# Patient Record
Sex: FEMALE | NOT HISPANIC OR LATINO | Employment: OTHER | URBAN - METROPOLITAN AREA
[De-identification: names, ages, dates, MRNs, and addresses within clinical notes are randomized per-mention and may not be internally consistent; named-entity substitution may affect disease eponyms.]

---

## 2021-03-11 PROCEDURE — U0004 COV-19 TEST NON-CDC HGH THRU: HCPCS | Performed by: FAMILY MEDICINE

## 2021-03-11 PROCEDURE — U0005 INFEC AGEN DETEC AMPLI PROBE: HCPCS | Performed by: FAMILY MEDICINE

## 2021-03-12 ENCOUNTER — TELEPHONE (OUTPATIENT)
Dept: URGENT CARE | Facility: CLINIC | Age: 86
End: 2021-03-12

## 2021-04-09 ENCOUNTER — APPOINTMENT (OUTPATIENT)
Dept: GENERAL RADIOLOGY | Facility: HOSPITAL | Age: 86
End: 2021-04-09

## 2021-04-09 ENCOUNTER — HOSPITAL ENCOUNTER (INPATIENT)
Facility: HOSPITAL | Age: 86
LOS: 4 days | Discharge: NURSING, REHAB OR LONG-TERM CARE | End: 2021-04-13
Attending: EMERGENCY MEDICINE | Admitting: FAMILY MEDICINE

## 2021-04-09 ENCOUNTER — ANESTHESIA EVENT (OUTPATIENT)
Dept: PERIOP | Facility: HOSPITAL | Age: 86
End: 2021-04-09

## 2021-04-09 DIAGNOSIS — D72.829 LEUKOCYTOSIS, UNSPECIFIED TYPE: ICD-10-CM

## 2021-04-09 DIAGNOSIS — S42.212A CLOSED DISPLACED FRACTURE OF SURGICAL NECK OF LEFT HUMERUS, UNSPECIFIED FRACTURE MORPHOLOGY, INITIAL ENCOUNTER: Primary | ICD-10-CM

## 2021-04-09 PROBLEM — D64.9 ANEMIA: Status: ACTIVE | Noted: 2021-04-09

## 2021-04-09 PROBLEM — H10.32 ACUTE CONJUNCTIVITIS OF LEFT EYE: Status: ACTIVE | Noted: 2021-04-09

## 2021-04-09 LAB
ABO GROUP BLD: NORMAL
ABO GROUP BLD: NORMAL
ALBUMIN SERPL-MCNC: 3.4 G/DL (ref 3.5–5.2)
ALBUMIN/GLOB SERPL: 0.9 G/DL
ALP SERPL-CCNC: 135 U/L (ref 39–117)
ALT SERPL W P-5'-P-CCNC: 9 U/L (ref 1–33)
ANION GAP SERPL CALCULATED.3IONS-SCNC: 11 MMOL/L (ref 5–15)
ANTI-E: NORMAL
AST SERPL-CCNC: 17 U/L (ref 1–32)
BACTERIA UR QL AUTO: NORMAL /HPF
BASOPHILS # BLD AUTO: 0.07 10*3/MM3 (ref 0–0.2)
BASOPHILS NFR BLD AUTO: 0.3 % (ref 0–1.5)
BILIRUB SERPL-MCNC: 0.3 MG/DL (ref 0–1.2)
BILIRUB UR QL STRIP: NEGATIVE
BLD GP AB SCN SERPL QL: POSITIVE
BUN SERPL-MCNC: 16 MG/DL (ref 8–23)
BUN/CREAT SERPL: 16.3 (ref 7–25)
CALCIUM SPEC-SCNC: 9.3 MG/DL (ref 8.6–10.5)
CHLORIDE SERPL-SCNC: 101 MMOL/L (ref 98–107)
CLARITY UR: CLEAR
CO2 SERPL-SCNC: 27 MMOL/L (ref 22–29)
COLOR UR: YELLOW
CREAT SERPL-MCNC: 0.98 MG/DL (ref 0.57–1)
DEPRECATED RDW RBC AUTO: 43 FL (ref 37–54)
EOSINOPHIL # BLD AUTO: 0 10*3/MM3 (ref 0–0.4)
EOSINOPHIL NFR BLD AUTO: 0 % (ref 0.3–6.2)
ERYTHROCYTE [DISTWIDTH] IN BLOOD BY AUTOMATED COUNT: 12.9 % (ref 12.3–15.4)
FLUAV RNA RESP QL NAA+PROBE: NOT DETECTED
FLUBV RNA RESP QL NAA+PROBE: NOT DETECTED
GFR SERPL CREATININE-BSD FRML MDRD: 53 ML/MIN/1.73
GFR SERPL CREATININE-BSD FRML MDRD: 65 ML/MIN/1.73
GLOBULIN UR ELPH-MCNC: 3.6 GM/DL
GLUCOSE SERPL-MCNC: 141 MG/DL (ref 65–99)
GLUCOSE UR STRIP-MCNC: NEGATIVE MG/DL
HCT VFR BLD AUTO: 30.1 % (ref 34–46.6)
HGB BLD-MCNC: 9.5 G/DL (ref 12–15.9)
HGB UR QL STRIP.AUTO: NEGATIVE
HYALINE CASTS UR QL AUTO: NORMAL /LPF
IMM GRANULOCYTES # BLD AUTO: 0.22 10*3/MM3 (ref 0–0.05)
IMM GRANULOCYTES NFR BLD AUTO: 0.8 % (ref 0–0.5)
KETONES UR QL STRIP: NEGATIVE
LEUKOCYTE ESTERASE UR QL STRIP.AUTO: ABNORMAL
LYMPHOCYTES # BLD AUTO: 0.93 10*3/MM3 (ref 0.7–3.1)
LYMPHOCYTES NFR BLD AUTO: 3.5 % (ref 19.6–45.3)
MCH RBC QN AUTO: 28.9 PG (ref 26.6–33)
MCHC RBC AUTO-ENTMCNC: 31.6 G/DL (ref 31.5–35.7)
MCV RBC AUTO: 91.5 FL (ref 79–97)
MONOCYTES # BLD AUTO: 1.42 10*3/MM3 (ref 0.1–0.9)
MONOCYTES NFR BLD AUTO: 5.4 % (ref 5–12)
NEUTROPHILS NFR BLD AUTO: 23.86 10*3/MM3 (ref 1.7–7)
NEUTROPHILS NFR BLD AUTO: 90 % (ref 42.7–76)
NITRITE UR QL STRIP: NEGATIVE
NRBC BLD AUTO-RTO: 0 /100 WBC (ref 0–0.2)
PH UR STRIP.AUTO: 6 [PH] (ref 5–8)
PLATELET # BLD AUTO: 554 10*3/MM3 (ref 140–450)
PMV BLD AUTO: 9.6 FL (ref 6–12)
POTASSIUM SERPL-SCNC: 3.4 MMOL/L (ref 3.5–5.2)
PROT SERPL-MCNC: 7 G/DL (ref 6–8.5)
PROT UR QL STRIP: ABNORMAL
QT INTERVAL: 370 MS
QTC INTERVAL: 452 MS
RBC # BLD AUTO: 3.29 10*6/MM3 (ref 3.77–5.28)
RBC # UR: NORMAL /HPF
REF LAB TEST METHOD: NORMAL
RH BLD: POSITIVE
RH BLD: POSITIVE
SARS-COV-2 RNA RESP QL NAA+PROBE: NOT DETECTED
SODIUM SERPL-SCNC: 139 MMOL/L (ref 136–145)
SP GR UR STRIP: 1.02 (ref 1–1.03)
SQUAMOUS #/AREA URNS HPF: NORMAL /HPF
T&S EXPIRATION DATE: NORMAL
TROPONIN T SERPL-MCNC: <0.01 NG/ML (ref 0–0.03)
UROBILINOGEN UR QL STRIP: ABNORMAL
WBC # BLD AUTO: 26.5 10*3/MM3 (ref 3.4–10.8)
WBC UR QL AUTO: NORMAL /HPF

## 2021-04-09 PROCEDURE — 80053 COMPREHEN METABOLIC PANEL: CPT | Performed by: EMERGENCY MEDICINE

## 2021-04-09 PROCEDURE — 25010000002 HYDROMORPHONE PER 4 MG: Performed by: INTERNAL MEDICINE

## 2021-04-09 PROCEDURE — 86870 RBC ANTIBODY IDENTIFICATION: CPT | Performed by: EMERGENCY MEDICINE

## 2021-04-09 PROCEDURE — 86900 BLOOD TYPING SEROLOGIC ABO: CPT | Performed by: EMERGENCY MEDICINE

## 2021-04-09 PROCEDURE — 25810000003 SODIUM CHLORIDE 0.9 % WITH KCL 20 MEQ 20-0.9 MEQ/L-% SOLUTION: Performed by: INTERNAL MEDICINE

## 2021-04-09 PROCEDURE — 86922 COMPATIBILITY TEST ANTIGLOB: CPT

## 2021-04-09 PROCEDURE — 99284 EMERGENCY DEPT VISIT MOD MDM: CPT

## 2021-04-09 PROCEDURE — 86900 BLOOD TYPING SEROLOGIC ABO: CPT

## 2021-04-09 PROCEDURE — 86901 BLOOD TYPING SEROLOGIC RH(D): CPT

## 2021-04-09 PROCEDURE — 25010000002 ONDANSETRON PER 1 MG

## 2021-04-09 PROCEDURE — P9612 CATHETERIZE FOR URINE SPEC: HCPCS

## 2021-04-09 PROCEDURE — 86920 COMPATIBILITY TEST SPIN: CPT

## 2021-04-09 PROCEDURE — 85025 COMPLETE CBC W/AUTO DIFF WBC: CPT | Performed by: EMERGENCY MEDICINE

## 2021-04-09 PROCEDURE — 87636 SARSCOV2 & INF A&B AMP PRB: CPT | Performed by: EMERGENCY MEDICINE

## 2021-04-09 PROCEDURE — 25010000002 HYDROMORPHONE PER 4 MG: Performed by: EMERGENCY MEDICINE

## 2021-04-09 PROCEDURE — 71045 X-RAY EXAM CHEST 1 VIEW: CPT

## 2021-04-09 PROCEDURE — 93005 ELECTROCARDIOGRAM TRACING: CPT | Performed by: EMERGENCY MEDICINE

## 2021-04-09 PROCEDURE — 99223 1ST HOSP IP/OBS HIGH 75: CPT | Performed by: INTERNAL MEDICINE

## 2021-04-09 PROCEDURE — 73030 X-RAY EXAM OF SHOULDER: CPT

## 2021-04-09 PROCEDURE — 25010000002 HEPARIN (PORCINE) PER 1000 UNITS: Performed by: INTERNAL MEDICINE

## 2021-04-09 PROCEDURE — 81001 URINALYSIS AUTO W/SCOPE: CPT | Performed by: EMERGENCY MEDICINE

## 2021-04-09 PROCEDURE — 86850 RBC ANTIBODY SCREEN: CPT | Performed by: EMERGENCY MEDICINE

## 2021-04-09 PROCEDURE — 86901 BLOOD TYPING SEROLOGIC RH(D): CPT | Performed by: EMERGENCY MEDICINE

## 2021-04-09 PROCEDURE — 84484 ASSAY OF TROPONIN QUANT: CPT | Performed by: EMERGENCY MEDICINE

## 2021-04-09 PROCEDURE — 86905 BLOOD TYPING RBC ANTIGENS: CPT

## 2021-04-09 RX ORDER — CIPROFLOXACIN HYDROCHLORIDE 3.5 MG/ML
2 SOLUTION/ DROPS TOPICAL
Status: DISCONTINUED | OUTPATIENT
Start: 2021-04-09 | End: 2021-04-13 | Stop reason: HOSPADM

## 2021-04-09 RX ORDER — ONDANSETRON 2 MG/ML
4 INJECTION INTRAMUSCULAR; INTRAVENOUS EVERY 6 HOURS PRN
Status: DISCONTINUED | OUTPATIENT
Start: 2021-04-09 | End: 2021-04-13 | Stop reason: HOSPADM

## 2021-04-09 RX ORDER — MORPHINE SULFATE 2 MG/ML
1 INJECTION, SOLUTION INTRAMUSCULAR; INTRAVENOUS EVERY 4 HOURS PRN
Status: DISCONTINUED | OUTPATIENT
Start: 2021-04-09 | End: 2021-04-09

## 2021-04-09 RX ORDER — HYDROMORPHONE HYDROCHLORIDE 1 MG/ML
0.5 INJECTION, SOLUTION INTRAMUSCULAR; INTRAVENOUS; SUBCUTANEOUS
Status: DISCONTINUED | OUTPATIENT
Start: 2021-04-09 | End: 2021-04-13 | Stop reason: HOSPADM

## 2021-04-09 RX ORDER — MIDAZOLAM HYDROCHLORIDE 1 MG/ML
0.5 INJECTION INTRAMUSCULAR; INTRAVENOUS
Status: CANCELLED | OUTPATIENT
Start: 2021-04-09

## 2021-04-09 RX ORDER — NALOXONE HCL 0.4 MG/ML
0.4 VIAL (ML) INJECTION
Status: DISCONTINUED | OUTPATIENT
Start: 2021-04-09 | End: 2021-04-13 | Stop reason: HOSPADM

## 2021-04-09 RX ORDER — LIDOCAINE HYDROCHLORIDE 10 MG/ML
0.5 INJECTION, SOLUTION EPIDURAL; INFILTRATION; INTRACAUDAL; PERINEURAL ONCE AS NEEDED
Status: CANCELLED | OUTPATIENT
Start: 2021-04-09

## 2021-04-09 RX ORDER — HYDROMORPHONE HYDROCHLORIDE 1 MG/ML
0.5 INJECTION, SOLUTION INTRAMUSCULAR; INTRAVENOUS; SUBCUTANEOUS ONCE
Status: COMPLETED | OUTPATIENT
Start: 2021-04-09 | End: 2021-04-09

## 2021-04-09 RX ORDER — FAMOTIDINE 20 MG/1
20 TABLET, FILM COATED ORAL ONCE
Status: CANCELLED | OUTPATIENT
Start: 2021-04-09 | End: 2021-04-09

## 2021-04-09 RX ORDER — ONDANSETRON 4 MG/1
4 TABLET, FILM COATED ORAL EVERY 6 HOURS PRN
Status: DISCONTINUED | OUTPATIENT
Start: 2021-04-09 | End: 2021-04-13 | Stop reason: HOSPADM

## 2021-04-09 RX ORDER — MIDAZOLAM HYDROCHLORIDE 1 MG/ML
1 INJECTION INTRAMUSCULAR; INTRAVENOUS
Status: CANCELLED | OUTPATIENT
Start: 2021-04-09

## 2021-04-09 RX ORDER — SODIUM CHLORIDE 0.9 % (FLUSH) 0.9 %
10 SYRINGE (ML) INJECTION EVERY 12 HOURS SCHEDULED
Status: DISCONTINUED | OUTPATIENT
Start: 2021-04-09 | End: 2021-04-13 | Stop reason: HOSPADM

## 2021-04-09 RX ORDER — SODIUM CHLORIDE AND POTASSIUM CHLORIDE 150; 900 MG/100ML; MG/100ML
75 INJECTION, SOLUTION INTRAVENOUS CONTINUOUS
Status: DISCONTINUED | OUTPATIENT
Start: 2021-04-09 | End: 2021-04-11

## 2021-04-09 RX ORDER — SODIUM CHLORIDE 0.9 % (FLUSH) 0.9 %
10 SYRINGE (ML) INJECTION EVERY 12 HOURS SCHEDULED
Status: CANCELLED | OUTPATIENT
Start: 2021-04-09

## 2021-04-09 RX ORDER — HYDROCODONE BITARTRATE AND ACETAMINOPHEN 5; 325 MG/1; MG/1
1 TABLET ORAL EVERY 4 HOURS PRN
Status: DISCONTINUED | OUTPATIENT
Start: 2021-04-09 | End: 2021-04-13 | Stop reason: HOSPADM

## 2021-04-09 RX ORDER — ONDANSETRON 2 MG/ML
INJECTION INTRAMUSCULAR; INTRAVENOUS
Status: COMPLETED
Start: 2021-04-09 | End: 2021-04-09

## 2021-04-09 RX ORDER — SODIUM CHLORIDE 0.9 % (FLUSH) 0.9 %
10 SYRINGE (ML) INJECTION AS NEEDED
Status: CANCELLED | OUTPATIENT
Start: 2021-04-09

## 2021-04-09 RX ORDER — HEPARIN SODIUM 5000 [USP'U]/ML
5000 INJECTION, SOLUTION INTRAVENOUS; SUBCUTANEOUS EVERY 8 HOURS SCHEDULED
Status: DISCONTINUED | OUTPATIENT
Start: 2021-04-09 | End: 2021-04-13 | Stop reason: HOSPADM

## 2021-04-09 RX ORDER — SODIUM CHLORIDE 0.9 % (FLUSH) 0.9 %
10 SYRINGE (ML) INJECTION AS NEEDED
Status: DISCONTINUED | OUTPATIENT
Start: 2021-04-09 | End: 2021-04-13 | Stop reason: HOSPADM

## 2021-04-09 RX ORDER — ACETAMINOPHEN 325 MG/1
650 TABLET ORAL EVERY 4 HOURS PRN
Status: DISCONTINUED | OUTPATIENT
Start: 2021-04-09 | End: 2021-04-13 | Stop reason: HOSPADM

## 2021-04-09 RX ADMIN — HYDROMORPHONE HYDROCHLORIDE 0.5 MG: 1 INJECTION, SOLUTION INTRAMUSCULAR; INTRAVENOUS; SUBCUTANEOUS at 14:12

## 2021-04-09 RX ADMIN — HYDROMORPHONE HYDROCHLORIDE 0.5 MG: 1 INJECTION, SOLUTION INTRAMUSCULAR; INTRAVENOUS; SUBCUTANEOUS at 18:32

## 2021-04-09 RX ADMIN — HEPARIN SODIUM 5000 UNITS: 5000 INJECTION INTRAVENOUS; SUBCUTANEOUS at 21:46

## 2021-04-09 RX ADMIN — HYDROCODONE BITARTRATE AND ACETAMINOPHEN 1 TABLET: 5; 325 TABLET ORAL at 17:22

## 2021-04-09 RX ADMIN — CIPROFLOXACIN 2 DROP: 3 SOLUTION OPHTHALMIC at 21:46

## 2021-04-09 RX ADMIN — ONDANSETRON 4 MG: 2 INJECTION INTRAMUSCULAR; INTRAVENOUS at 14:12

## 2021-04-09 RX ADMIN — POTASSIUM CHLORIDE AND SODIUM CHLORIDE 75 ML/HR: 900; 150 INJECTION, SOLUTION INTRAVENOUS at 18:33

## 2021-04-09 RX ADMIN — SODIUM CHLORIDE, PRESERVATIVE FREE 10 ML: 5 INJECTION INTRAVENOUS at 21:46

## 2021-04-09 RX ADMIN — HYDROMORPHONE HYDROCHLORIDE 0.5 MG: 1 INJECTION, SOLUTION INTRAMUSCULAR; INTRAVENOUS; SUBCUTANEOUS at 22:50

## 2021-04-09 NOTE — ED PROVIDER NOTES
Subjective   89-year-old female presents with complaint of left shoulder pain after a fall.  The patient reports suffering a mechanical fall in which she fell on the lateral left shoulder.  She denies striking her head or have any loss of consciousness.  She complains of pain in her left shoulder only.  She denies any neck or midline back pain.  No reported chest or abdominal pain.  No reported pain to the hips or the bilateral lower extremities.  She reports previous left hip replacement in the past, but that was performed at the [facility].  She denies any previous surgical intervention to her upper extremities.  She denies any recent infectious symptoms including no fever, bodies, or chills.  No acute respiratory symptoms.  She lives with her daughter and uses a walker for ambulation.  At baseline she has normal mentation, and her mentation continues to be intact upon my evaluation here in the ER.  No other acute complaints.          Review of Systems   Constitutional: Negative for chills, fatigue and fever.   HENT: Negative for congestion, ear pain, postnasal drip, sinus pressure and sore throat.    Eyes: Negative for pain, redness and visual disturbance.   Respiratory: Negative for cough, chest tightness and shortness of breath.    Cardiovascular: Negative for chest pain, palpitations and leg swelling.   Gastrointestinal: Negative for abdominal pain, anal bleeding, blood in stool, diarrhea, nausea and vomiting.   Endocrine: Negative for polydipsia and polyuria.   Genitourinary: Negative for difficulty urinating, dysuria, frequency and urgency.   Musculoskeletal: Positive for arthralgias. Negative for back pain and neck pain.   Skin: Negative for pallor and rash.   Allergic/Immunologic: Negative for environmental allergies and immunocompromised state.   Neurological: Negative for dizziness, weakness and headaches.   Hematological: Negative for adenopathy.   Psychiatric/Behavioral: Negative for  confusion, self-injury and suicidal ideas. The patient is not nervous/anxious.    All other systems reviewed and are negative.      Past Medical History:   Diagnosis Date   • Arthritis    • Breast cancer (CMS/HCC)    • Hypertension        Allergies   Allergen Reactions   • Ibuprofen GI Intolerance   • Morphine GI Intolerance   • Penicillin V Unknown - High Severity   • Penicillins Hives   • Penicillins Itching   • Sulfa Antibiotics Hives   • Sulfa Antibiotics Itching       Past Surgical History:   Procedure Laterality Date   • CHOLECYSTECTOMY     • DILATATION AND CURETTAGE     • FRACTURE SURGERY      Rt hip   • HYSTERECTOMY     • JOINT REPLACEMENT     • MASTECTOMY Right     2008   • MASTECTOMY         Family History   Problem Relation Age of Onset   • Cancer Mother    • Heart failure Father        Social History     Socioeconomic History   • Marital status:      Spouse name: Not on file   • Number of children: Not on file   • Years of education: Not on file   • Highest education level: Not on file   Tobacco Use   • Smoking status: Never Smoker   • Smokeless tobacco: Never Used   Vaping Use   • Vaping Use: Never used   Substance and Sexual Activity   • Alcohol use: Never   • Drug use: Never   • Sexual activity: Defer           Objective   Physical Exam  Vitals and nursing note reviewed.   Constitutional:       General: She is not in acute distress.     Appearance: Normal appearance. She is well-developed. She is not toxic-appearing or diaphoretic.   HENT:      Head: Normocephalic and atraumatic.      Right Ear: External ear normal.      Left Ear: External ear normal.      Nose: Nose normal.   Eyes:      General: Lids are normal.      Pupils: Pupils are equal, round, and reactive to light.   Neck:      Trachea: No tracheal deviation.   Cardiovascular:      Rate and Rhythm: Normal rate and regular rhythm.      Pulses: No decreased pulses.      Heart sounds: Normal heart sounds. No murmur heard.   No friction  rub. No gallop.    Pulmonary:      Effort: Pulmonary effort is normal. No respiratory distress.      Breath sounds: Normal breath sounds. No decreased breath sounds, wheezing, rhonchi or rales.   Abdominal:      General: Bowel sounds are normal.      Palpations: Abdomen is soft.      Tenderness: There is no abdominal tenderness. There is no guarding or rebound.   Musculoskeletal:      Right shoulder: Normal.      Left shoulder: Swelling, deformity, tenderness and bony tenderness present. Decreased range of motion.      Cervical back: Normal range of motion and neck supple.   Lymphadenopathy:      Cervical: No cervical adenopathy.   Skin:     General: Skin is warm and dry.      Findings: No rash.   Neurological:      Mental Status: She is alert and oriented to person, place, and time.      Cranial Nerves: No cranial nerve deficit.      Sensory: No sensory deficit.   Psychiatric:         Speech: Speech normal.         Behavior: Behavior normal.         Thought Content: Thought content normal.         Judgment: Judgment normal.         Procedures           ED Course  ED Course as of Apr 09 1550   Fri Apr 09, 2021   1405 I discussed the patient with Dr. Matos of orthopedic surgery, who will consult on the patient.    [NS]      ED Course User Index  [NS] Sylvia Sarkar MD                                           MDM  Number of Diagnoses or Management Options  Closed displaced fracture of surgical neck of left humerus, unspecified fracture morphology, initial encounter: new and requires workup  Leukocytosis, unspecified type: new and requires workup  Diagnosis management comments: Obvious deformity and swelling over the left shoulder.    X-ray shows humeral head is well-seated in glenoid fossa.  However, the patient has a left humeral neck fracture with associate displacement.    Dr. Matos of orthopedic surgery has been paged.    Hospitalist informed and will admit.       Amount and/or Complexity of Data  Reviewed  Clinical lab tests: ordered and reviewed  Tests in the radiology section of CPT®: ordered and reviewed  Obtain history from someone other than the patient: yes  Review and summarize past medical records: yes  Discuss the patient with other providers: yes  Independent visualization of images, tracings, or specimens: yes        Final diagnoses:   Closed displaced fracture of surgical neck of left humerus, unspecified fracture morphology, initial encounter   Leukocytosis, unspecified type       ED Disposition  ED Disposition     ED Disposition Condition Comment    Decision to Admit  Level of Care: Med/Surg [1]   Diagnosis: Closed displaced fracture of surgical neck of left humerus, unspecified fracture morphology, initial encounter [[id number]]   Admitting Physician: OREN WREN [[id number]]   Attending Physician: OREN WREN [[id number]]   Isolate for COVID?: No [0]   Certification: I Certify That Inpatient Hospital Services Are Medically Necessary For Greater Than 2 Midnights            No follow-up provider specified.       Medication List      No changes were made to your prescriptions during this visit.          Sylvia Sarkar MD  04/09/21 0331

## 2021-04-09 NOTE — ANESTHESIA PREPROCEDURE EVALUATION
Anesthesia Evaluation     Patient summary reviewed and Nursing notes reviewed   no history of anesthetic complications:  NPO Solid Status: > 8 hours  NPO Liquid Status: > 8 hours           Airway   Mallampati: II  TM distance: >3 FB  Neck ROM: full  No difficulty expected  Dental - normal exam     Pulmonary - negative pulmonary ROS and normal exam   Cardiovascular - normal exam    ECG reviewed    (+) hypertension,       Neuro/Psych- negative ROS  GI/Hepatic/Renal/Endo - negative ROS     Musculoskeletal         ROS comment: Proximal humerus fx  Abdominal    Substance History      OB/GYN          Other   arthritis, blood dyscrasia anemia,   history of cancer (h/o breast cancer)    ROS/Med Hx Other: leukocytosis                Anesthesia Plan    ASA 3     general with block     intravenous induction     Anesthetic plan, all risks, benefits, and alternatives have been provided, discussed and informed consent has been obtained with: patient.    Plan discussed with CRNA.

## 2021-04-09 NOTE — CONSULTS
Orthopedic Consult      Patient: Andra Buchanan    Date of Admission: 4/9/2021 12:34 PM    YOB: 1931    Medical Record Number: [id number]    Attending Physician: Marcos Sanon MD    Consulting Physician: Eddie Matos Jr., MD      Chief Complaints: Closed displaced fracture of surgical neck of left humerus, unspecified fracture morphology, initial encounter [S42.212A]      History of Present Illness: 89 y.o. female admitted to [facility] with Closed displaced fracture of surgical neck of left humerus, unspecified fracture morphology, initial encounter [S42.212A]. I was consulted for further evaluation and treatment of left proximal humerus fracture. Onset of symptoms was abrupt starting a few hours ago.  Symptoms are associated with left shoulder pain.  Symptoms are aggravated by motion of left upper extremity.   Symptoms improve with rest and elevation.  This is an 89-year-old female who is an ambulator with a rolling walker presents after mechanical fall onto her left shoulder with resultant 100% displaced left proximal humerus fracture.  She denies any motor deficits or paresthesias.       Allergies   Allergen Reactions   • Ibuprofen GI Intolerance   • Morphine GI Intolerance   • Penicillin V Unknown - High Severity   • Penicillins Hives   • Penicillins Itching   • Sulfa Antibiotics Hives   • Sulfa Antibiotics Itching        Home Medications:  Medications Prior to Admission   Medication Sig Dispense Refill Last Dose   • Spacer/Aero-Holding Chambers (AeroChamber MV) inhaler Use as instructed 1 each 0 4/8/2021 at Unknown time         Past Medical History:   Diagnosis Date   • Arthritis    • Breast cancer (CMS/HCC)    • Hypertension         Past Surgical History:   Procedure Laterality Date   • CHOLECYSTECTOMY     • DILATATION AND CURETTAGE     • FRACTURE SURGERY      Rt hip   • HYSTERECTOMY     • JOINT REPLACEMENT     • MASTECTOMY Right     2008   • MASTECTOMY          Social History      Occupational History   • Not on file   Tobacco Use   • Smoking status: Never Smoker   • Smokeless tobacco: Never Used   Vaping Use   • Vaping Use: Never used   Substance and Sexual Activity   • Alcohol use: Never   • Drug use: Never   • Sexual activity: Defer      Social History     Social History Narrative    ** Merged History Encounter **             Family History   Problem Relation Age of Onset   • Cancer Mother    • Heart failure Father          Review of Systems:   HEENT: Patient denies any headaches, vision changes, change in hearing, or tinnitus, Patient denies any rhinorrhea, epistaxis, sinus pain, mouth or dental problems, sore throat or hoarseness, or dysphagia  Pulmonary: Patient denies any cough, congestion, SOA, or wheezing  Cardiovascular: Patient denies any chest pain, dyspnea, palpitations, weakness, intolerance of exercise, varicosities, swelling of extremities, known murmur  Gastrointestinal:  Patient denies nausea, vomiting, diarrhea, constipation, loss  of appetite, change in appetite, dysphagia, gas, heartburn, melena, change in bowel habits, use of laxatives or other drugs to alter the function of the gastrointestinal tract.  Genital/Urinary: Patient denies dysuria, change in color of urine, change in frequency of urination, pain with urgency, incontinence, retention, or nocturia.  Musculoskeletal: Patient denies increased warmth; redness; or swelling of joints; limitation of function; deformity; crepitation: pain in a joint or an extremity, the neck, or the back, especially with movement.  Neurological: Patient denies dizziness, tremor, ataxia, difficulty in speaking, change in speech, paresthesia, loss of sensation, seizures, syncope, changes in memory.  Endocrine system: Patient denies tremors, palpitations, intolerance of heat or cold, polyuria, polydipsia, polyphagia, diaphoresis, exophthalmos, or goiter.  Psychological: Patient denies thoughts/plans or harming self or other;  "depression,  insomnia, night terrors, gladys, memory loss, disorientation.  Skin: Patient denies any bruising, rashes, discoloration, pruritus, wounds, ulcers, decubiti, changes in the hair or nails  Hematopoietic: Patient denies history of spontaneous or excessive bleeding, epistaxis, hematuria, melena, fatigue, enlarged or tender lymph nodes, pallor, history of anemia.    Physical Exam: 89 y.o. female  General Appearance:    Alert, cooperative, in no acute distress                   Vitals:    04/09/21 1238 04/09/21 1300 04/09/21 1414 04/09/21 1618   BP:  136/60  171/79   BP Location:    Right leg   Patient Position:    Lying   Pulse:    88   Resp:    18   Temp:    97.7 °F (36.5 °C)   TempSrc:    Oral   SpO2:  95% 95% 93%   Weight: 47.2 kg (104 lb)      Height: 170.2 cm (67\")           Head:    Normocephalic, without obvious abnormality, atraumatic      Right Upper Extremity:  No obvious deformity, painless ROM shoulder, elbow, wrist, no joint instability, normal distal strength and sensation to light touch, skin intact without cyanosis, clubbing, edema; +2 radial pulse  Left Upper Extremity: Positive ecchymoses and pain about the left shoulder girdle with positive axillary nerve sensation.  Normal distal strength and sensation to light touch, skin intact without cyanosis, clubbing, edema; +2 radial pulse  Right Lower Extremity:  No obvious deformity, painless ROM hip, knee, ankle, compartments soft, normal distal strength and sensation to light touch, skin intact without cyanosis, clubbing, edema; +2 dorsalis pedis pulse  Left Lower Extremity:  No obvious deformity, painless ROM hip, knee, ankle, compartments soft, normal distal strength and sensation to light touch, skin intact without cyanosis, clubbing, edema; +2 dorsalis pedis pulse         Diagnostic Tests:    I have reviewed the labs, radiology results and diagnostic studies: AP and outlet views of the left proximal humerus demonstrate a 2 part greater than " 100% displaced surgical neck fracture.  The glenohumeral joint remains reduced.  There is minimal arthrosis of the glenohumeral joint.    Results from last 7 days   Lab Units 04/09/21  1359   WBC 10*3/mm3 26.50*   HEMOGLOBIN g/dL 9.5*   PLATELETS 10*3/mm3 554*     Results from last 7 days   Lab Units 04/09/21  1359   SODIUM mmol/L 139   POTASSIUM mmol/L 3.4*   CO2 mmol/L 27.0   CREATININE mg/dL 0.98   GLUCOSE mg/dL 141*           Assessment: Left displaced 2 part proximal humerus fracture  Patient Active Problem List   Diagnosis   • Closed displaced fracture of surgical neck of left humerus   • Anemia   • Leukocytosis   • Acute conjunctivitis of left eye           Plan:  The patient voiced understanding of the risks, benefits, and alternative forms of treatment that were discussed and the patient consents to proceed with surgical fixation.  Given the patient's reliance on her left upper extremity for ambulation as well as her significant pain and dysfunction of the left shoulder, along with a very low healing rate given the amount of displacement, we discussed the risk and benefits of performing operative fixation of her left proximal humerus fracture.  This will likely involve fixation with an intramedullary nail versus plate fixation versus reverse total shoulder arthroplasty.  We discussed the risks and benefits of each option and she does know that we will likely proceed with intramedullary fixation to allow immediate weightbearing and range of motion as tolerated. The risks and benefits were discussed with the patient to include, but not limited to: bleeding, infection, nerve injury, failure of fixation, need for further procedures, loss of limb or life.  She understands the risks and wished to proceed with procedure.    Please keep the patient n.p.o. at midnight  Patient is posted for intramedullary nail of left proximal humerus on 4/10/2021  Nonweightbearing left upper extremity            Eddie Matos,  MD Joie  04/09/21  18:35 EDT

## 2021-04-09 NOTE — PROGRESS NOTES
Discharge Planning Assessment  [facility]     Patient Name: Andra Buchanan  MRN: [id number]  Today's Date: 4/9/2021    Admit Date: 4/9/2021    Discharge Needs Assessment     Row Name 04/09/21 1501       Living Environment    Lives With  child(drew), adult    Name(s) of Who Lives With Patient  Mitzi Siegel (Daughter) [phone] (Home Phone)    Current Living Arrangements  home/apartment/condo    Potentially Unsafe Housing Conditions  unable to assess    Primary Care Provided by  child(drew)    Provides Primary Care For  no one, unable/limited ability to care for self    Family Caregiver if Needed  child(drew), adult    Family Caregiver Names  Mitzi Siegel (Daughter) [phone] (Home Phone)    Quality of Family Relationships  helpful;involved;supportive    Able to Return to Prior Arrangements  yes       Resource/Environmental Concerns    Resource/Environmental Concerns  none    Transportation Concerns  car, none       Transition Planning    Patient/Family Anticipates Transition to  home with help/services    Patient/Family Anticipated Services at Transition  rehabilitation services    Transportation Anticipated  family or friend will provide       Discharge Needs Assessment    Readmission Within the Last 30 Days  no previous admission in last 30 days    Equipment Currently Used at Home  walker, rolling    Concerns to be Addressed  discharge planning    Concerns Comments  home with HH vs inpt rehab    Anticipated Changes Related to Illness  none    Equipment Needed After Discharge  none    Outpatient/Agency/Support Group Needs  homecare agency;inpatient rehabilitation facility        Discharge Plan     Row Name 04/09/21 1506       Plan    Plan  initial    Plan Comments  Pt lives with her daughter in [facility]. She requires assistance with ADL. She has been able to use RW with ambulation. Daughter expresses need for some type of rehab PCP is Nataly Hicks    Final Discharge Disposition Code  30 - still a  patient        Continued Care and Services - Admitted Since 4/9/2021    Coordination has not been started for this encounter.         Demographic Summary    No documentation.       Functional Status     Row Name 04/09/21 1500       Functional Status    Usual Activity Tolerance  fair       Functional Status, IADL    Medications  completely dependent    Meal Preparation  completely dependent    Housekeeping  completely dependent    Laundry  completely dependent    Shopping  completely dependent       Mental Status    General Appearance WDL  WDL       Mental Status Summary    Recent Changes in Mental Status/Cognitive Functioning  no changes       Employment/    Employment Status  retired        Psychosocial    No documentation.       Abuse/Neglect    No documentation.       Legal    No documentation.       Substance Abuse    No documentation.       Patient Forms    No documentation.           Jolie Silver RN

## 2021-04-09 NOTE — H&P
[facility] Medicine Services  HISTORY AND PHYSICAL    Patient Name: Andra Buchanan  : 1931  MRN: [id number]  Primary Care Physician: Brianna, No Known  Date of admission: 2021      Subjective   Subjective     Chief Complaint:  Fall    HPI:  Andra Buchanan is a 89 y.o. female who is generally healthy with no significant past medical history who presents to the emergency room after suffering a fall this afternoon.  She was using her walker and just gave out and fell onto her left shoulder.  Had inability to move it afterwards.  Imaging has demonstrated a left humeral neck fracture.  She has had increasing falls lately at least a couple per month according to the daughter.  Patient just says she is getting worn out and tired.  Other work-up revealed evidence of a leukocytosis to 26,000.  Patient denies any fever, chills, signs and symptoms of infection.  Hemoglobin was 9.5.  Says she has been told she is anemic her whole life.  Last colonoscopy was about 8 years ago.  Denies any overt bleeding.  She is also been dealing with a left eye conjunctivitis for which her optometrist gave her some sort of ointment and has an appointment upcoming with ophthalmology.  She will be admitted for further management.        COVID Details: [x] No Symptoms OR  Date of Symptom Onset:  __ Date of first positive COVID test:  __       Symptoms: [] Fever []  Cough [] Shortness of breath [] Change in taste or smell       Risks: [] Direct Exposure [] High risk facility [] None known      Review of Systems   Gen- No fevers, chills  CV- No chest pain, palpitations  Resp- No cough, dyspnea  GI- No N/V/D, abd pain    All other systems reviewed and are negative.     Personal History     Past Medical History:   Diagnosis Date   • Arthritis    • Breast cancer (CMS/HCC)    • Hypertension        Past Surgical History:   Procedure Laterality Date   • CHOLECYSTECTOMY     • DILATATION AND CURETTAGE     • FRACTURE  SURGERY      Rt hip   • HYSTERECTOMY     • JOINT REPLACEMENT     • MASTECTOMY Right     2008   • MASTECTOMY         Family History: family history includes Cancer in her mother; Heart failure in her father. Otherwise pertinent FHx was reviewed and unremarkable.     Social History:  reports that she has never smoked. She has never used smokeless tobacco. She reports that she does not drink alcohol and does not use drugs.  Social History     Social History Narrative    ** Merged History Encounter **        She lives with her daughter.  Denies any tobacco or alcohol use.  Normally uses a walker.    Medications:  Available home medication information reviewed.    Allergies   Allergen Reactions   • Ibuprofen GI Intolerance   • Morphine GI Intolerance   • Penicillin V Unknown - High Severity   • Penicillins Hives   • Penicillins Itching   • Sulfa Antibiotics Hives   • Sulfa Antibiotics Itching       Objective   Objective     Vital Signs:   Temp:  [97.9 °F (36.6 °C)] 97.9 °F (36.6 °C)  Heart Rate:  [89] 89  Resp:  [18] 18  BP: (121-136)/(60) 136/60       Physical Exam   Constitutional: Awake, alert.  Generally comfortable, lying in bed, age-appropriate  Eyes: PERRLA, sclerae anicteric, no conjunctival injection  HENT: NCAT, mucous membranes moist.  Lower lid left eye conjunctivitis with no drainage.  Neck: Supple, no thyromegaly, no lymphadenopathy, trachea midline  Respiratory: Clear to auscultation bilaterally, nonlabored respirations   Cardiovascular: RRR, no murmurs, rubs, or gallops  Gastrointestinal: Positive bowel sounds, soft, nontender, nondistended  Musculoskeletal: No bilateral ankle edema, no clubbing or cyanosis to extremities.  Deformity to left upper extremity at the shoulder with some bruising.  Psychiatric: Appropriate affect, cooperative  Neurologic: Oriented x 3, no focal deficits  Skin: No rashes      Result Review:  I have personally reviewed the results from the time of this admission to 04/09/21 3:29  PM EDT and agree with these findings:  [x]  Laboratory  []  Microbiology  [x]  Radiology  []  EKG/Telemetry   []  Cardiology/Vascular   []  Pathology  []  Old records  []  Other:  Most notable findings include:   Personally reviewed chest x-ray which showed no acute findings  Personally reviewed left shoulder film which shows left humeral neck fracture  White blood cell count 26,000, hemoglobin 9.5      LAB RESULTS:      Lab 04/09/21  1359   WBC 26.50*   HEMOGLOBIN 9.5*   HEMATOCRIT 30.1*   PLATELETS 554*   NEUTROS ABS 23.86*   IMMATURE GRANS (ABS) 0.22*   LYMPHS ABS 0.93   MONOS ABS 1.42*   EOS ABS 0.00   MCV 91.5         Lab 04/09/21  1359   SODIUM 139   POTASSIUM 3.4*   CHLORIDE 101   CO2 27.0   ANION GAP 11.0   BUN 16   CREATININE 0.98   GLUCOSE 141*   CALCIUM 9.3         Lab 04/09/21  1359   TOTAL PROTEIN 7.0   ALBUMIN 3.40*   GLOBULIN 3.6   ALT (SGPT) 9   AST (SGOT) 17   BILIRUBIN 0.3   ALK PHOS 135*         Lab 04/09/21  1359   TROPONIN T <0.010             Lab 04/09/21  1408   ABO TYPING O   RH TYPING Positive   ANTIBODY SCREEN Positive         Brief Urine Lab Results     None        Microbiology Results (last 10 days)     Procedure Component Value - Date/Time    COVID PRE-OP / PRE-PROCEDURE SCREENING ORDER (NO ISOLATION) - Swab, Nasopharynx [[id number]]  (Normal) Collected: 04/09/21 1358    Lab Status: Final result Specimen: Swab from Nasopharynx Updated: 04/09/21 6196    Narrative:      The following orders were created for panel order COVID PRE-OP / PRE-PROCEDURE SCREENING ORDER (NO ISOLATION) - Swab, Nasopharynx.  Procedure                               Abnormality         Status                     ---------                               -----------         ------                     COVID-19 and FLU A/B PCR...[[id number]]  Normal              Final result                 Please view results for these tests on the individual orders.    COVID-19 and FLU A/B PCR - Swab, Nasopharynx [[id number]]  (Normal)  Collected: 04/09/21 1358    Lab Status: Final result Specimen: Swab from Nasopharynx Updated: 04/09/21 1458     COVID19 Not Detected     Influenza A PCR Not Detected     Influenza B PCR Not Detected    Narrative:      Fact sheet for providers: https://www.fda.gov/media/226879/download    Fact sheet for patients: https://www.fda.gov/media/046416/download    Test performed by PCR.          XR Shoulder 2+ View Left    Result Date: 4/9/2021  EXAMINATION: XR SHOULDER 2+ VW LEFT-  INDICATION: Injury  COMPARISON: NONE  FINDINGS: There is a completely displaced, comminuted and foreshortened fracture of the left humerus neck. The humeral head appears to remain well aligned within the glenoid. The acromioclavicular joint spaces demonstrate some mild degenerative change, otherwise unremarkable.      Impression: There is a completely displaced, comminuted and foreshortened fracture of the left humerus neck. The humeral head appears to remain well aligned within the glenoid. The acromioclavicular joint spaces demonstrate some mild degenerative change, otherwise unremarkable.  This report was finalized on 4/9/2021 1:14 PM by Rodrigo Hall.      XR Chest 1 View    Result Date: 4/9/2021  EXAMINATION: XR CHEST 1 VW-  INDICATION: fall/left shoulder fracture; S42.212A-Unspecified displaced fracture of surgical neck of left humerus, initial encounter for closed fracture  COMPARISON: NONE  FINDINGS: No focal airspace disease, effusion or pneumothorax. Unremarkable heart and mediastinal contours. Redemonstrated comminuted and displaced left proximal humerus fracture.      Impression: No evidence of acute disease in the chest.  This report was finalized on 4/9/2021 2:38 PM by Rodrigo Hall.            Assessment/Plan   Assessment & Plan     Active Hospital Problems    Diagnosis  POA   • **Closed displaced fracture of surgical neck of left humerus [S42.212A]  Yes     Priority: Medium   • Anemia [D64.9]  Yes   • Leukocytosis [D72.829]   Yes   • Acute conjunctivitis of left eye [H10.32]  Yes       Ms. Buchanan is an 89-year-old female presents after a fall while using her walker and was found to have a left humeral neck fracture.    Left humeral neck fracture  -ED provider discussed with Dr. Matos.  Make n.p.o. after midnight with anticipation for repair tomorrow  -Pain control    Leukocytosis  -No evidence of infection on chest x-ray.  Urinalysis pending.  No fevers.  -Might be reactive, recheck CBC in the morning.  Hold antibiotics for now    Anemia  -Chronic per her, no overt bleeding  -Check in a.m. to ensure stability  -Defer extensive work-up to outpatient based on her goals of care    Left eye conjunctivitis  -Has been using an unknown ointment, will start on Cipro drops    PT and OT evaluations postoperatively.  Previously at home with daughter, will likely need rehab at the time of discharge.  Plan of care discussed with patient and daughter.  She wishes to be a DO NOT RESUSCITATE.    DVT prophylaxis: Heparin      CODE STATUS:    Code Status and Medical Interventions:   Ordered at: 04/09/21 1529     Limited Support to NOT Include:    Intubation    Cardioversion/Defibrillation     Level Of Support Discussed With:    Patient     Code Status:    No CPR     Medical Interventions (Level of Support Prior to Arrest):    Limited       Admission Status:  I believe this patient meets INPATIENT status due to acute arm fracture with need for surgery and subsequent rehab.  I feel patient’s risk for adverse outcomes and need for care warrant INPATIENT evaluation and I predict the patient’s care encounter to likely last beyond 2 midnights.      Marcos Sanon MD  04/09/21

## 2021-04-10 ENCOUNTER — ANESTHESIA EVENT CONVERTED (OUTPATIENT)
Dept: ANESTHESIOLOGY | Facility: HOSPITAL | Age: 86
End: 2021-04-10

## 2021-04-10 ENCOUNTER — ANESTHESIA (OUTPATIENT)
Dept: PERIOP | Facility: HOSPITAL | Age: 86
End: 2021-04-10

## 2021-04-10 ENCOUNTER — APPOINTMENT (OUTPATIENT)
Dept: GENERAL RADIOLOGY | Facility: HOSPITAL | Age: 86
End: 2021-04-10

## 2021-04-10 LAB
ANION GAP SERPL CALCULATED.3IONS-SCNC: 6 MMOL/L (ref 5–15)
BUN SERPL-MCNC: 17 MG/DL (ref 8–23)
BUN/CREAT SERPL: 20.7 (ref 7–25)
CALCIUM SPEC-SCNC: 8.5 MG/DL (ref 8.6–10.5)
CHLORIDE SERPL-SCNC: 104 MMOL/L (ref 98–107)
CO2 SERPL-SCNC: 29 MMOL/L (ref 22–29)
CREAT SERPL-MCNC: 0.82 MG/DL (ref 0.57–1)
DEPRECATED RDW RBC AUTO: 44.8 FL (ref 37–54)
ERYTHROCYTE [DISTWIDTH] IN BLOOD BY AUTOMATED COUNT: 13 % (ref 12.3–15.4)
GFR SERPL CREATININE-BSD FRML MDRD: 66 ML/MIN/1.73
GFR SERPL CREATININE-BSD FRML MDRD: 80 ML/MIN/1.73
GLUCOSE SERPL-MCNC: 104 MG/DL (ref 65–99)
HCT VFR BLD AUTO: 28 % (ref 34–46.6)
HGB BLD-MCNC: 8.4 G/DL (ref 12–15.9)
MCH RBC QN AUTO: 28.3 PG (ref 26.6–33)
MCHC RBC AUTO-ENTMCNC: 30 G/DL (ref 31.5–35.7)
MCV RBC AUTO: 94.3 FL (ref 79–97)
PLATELET # BLD AUTO: 440 10*3/MM3 (ref 140–450)
PMV BLD AUTO: 10 FL (ref 6–12)
POTASSIUM SERPL-SCNC: 4.5 MMOL/L (ref 3.5–5.2)
RBC # BLD AUTO: 2.97 10*6/MM3 (ref 3.77–5.28)
SODIUM SERPL-SCNC: 139 MMOL/L (ref 136–145)
WBC # BLD AUTO: 7.35 10*3/MM3 (ref 3.4–10.8)

## 2021-04-10 PROCEDURE — C1713 ANCHOR/SCREW BN/BN,TIS/BN: HCPCS | Performed by: ORTHOPAEDIC SURGERY

## 2021-04-10 PROCEDURE — 25010000002 HYDROMORPHONE PER 4 MG: Performed by: INTERNAL MEDICINE

## 2021-04-10 PROCEDURE — 0PSG06Z REPOSITION LEFT HUMERAL SHAFT WITH INTRAMEDULLARY INTERNAL FIXATION DEVICE, OPEN APPROACH: ICD-10-PCS | Performed by: ORTHOPAEDIC SURGERY

## 2021-04-10 PROCEDURE — 25010000002 PROPOFOL 10 MG/ML EMULSION: Performed by: NURSE ANESTHETIST, CERTIFIED REGISTERED

## 2021-04-10 PROCEDURE — 76000 FLUOROSCOPY <1 HR PHYS/QHP: CPT

## 2021-04-10 PROCEDURE — 25010000002 NEOSTIGMINE 10 MG/10ML SOLUTION: Performed by: NURSE ANESTHETIST, CERTIFIED REGISTERED

## 2021-04-10 PROCEDURE — C1769 GUIDE WIRE: HCPCS | Performed by: ORTHOPAEDIC SURGERY

## 2021-04-10 PROCEDURE — 25010000003 CEFAZOLIN PER 500 MG: Performed by: NURSE ANESTHETIST, CERTIFIED REGISTERED

## 2021-04-10 PROCEDURE — 93010 ELECTROCARDIOGRAM REPORT: CPT | Performed by: INTERNAL MEDICINE

## 2021-04-10 PROCEDURE — 25010000002 DEXAMETHASONE PER 1 MG: Performed by: NURSE ANESTHETIST, CERTIFIED REGISTERED

## 2021-04-10 PROCEDURE — 93005 ELECTROCARDIOGRAM TRACING: CPT | Performed by: FAMILY MEDICINE

## 2021-04-10 PROCEDURE — 25010000002 FENTANYL CITRATE (PF) 100 MCG/2ML SOLUTION: Performed by: NURSE ANESTHETIST, CERTIFIED REGISTERED

## 2021-04-10 PROCEDURE — 94799 UNLISTED PULMONARY SVC/PX: CPT

## 2021-04-10 PROCEDURE — 80048 BASIC METABOLIC PNL TOTAL CA: CPT | Performed by: INTERNAL MEDICINE

## 2021-04-10 PROCEDURE — 85027 COMPLETE CBC AUTOMATED: CPT | Performed by: INTERNAL MEDICINE

## 2021-04-10 PROCEDURE — 25010000003 CEFAZOLIN PER 500 MG: Performed by: ORTHOPAEDIC SURGERY

## 2021-04-10 PROCEDURE — 99233 SBSQ HOSP IP/OBS HIGH 50: CPT | Performed by: FAMILY MEDICINE

## 2021-04-10 PROCEDURE — 25010000002 ONDANSETRON PER 1 MG: Performed by: NURSE ANESTHETIST, CERTIFIED REGISTERED

## 2021-04-10 PROCEDURE — 25010000002 ONDANSETRON PER 1 MG: Performed by: INTERNAL MEDICINE

## 2021-04-10 PROCEDURE — 25010000002 ROPIVACAINE PER 1 MG: Performed by: NURSE ANESTHETIST, CERTIFIED REGISTERED

## 2021-04-10 PROCEDURE — 23615 OPTX PROX HUMRL FX W/INT FIX: CPT | Performed by: PHYSICIAN ASSISTANT

## 2021-04-10 PROCEDURE — 25810000003 SODIUM CHLORIDE 0.9 % WITH KCL 20 MEQ 20-0.9 MEQ/L-% SOLUTION: Performed by: ORTHOPAEDIC SURGERY

## 2021-04-10 PROCEDURE — 25010000002 PHENYLEPHRINE 10 MG/ML SOLUTION 1 ML VIAL: Performed by: NURSE ANESTHETIST, CERTIFIED REGISTERED

## 2021-04-10 PROCEDURE — 25010000002 HEPARIN (PORCINE) PER 1000 UNITS: Performed by: ORTHOPAEDIC SURGERY

## 2021-04-10 DEVICE — IMPLANTABLE DEVICE
Type: IMPLANTABLE DEVICE | Site: HUMERUS | Status: FUNCTIONAL
Brand: AFFIXUS® NATURAL NAIL®

## 2021-04-10 RX ORDER — ROCURONIUM BROMIDE 10 MG/ML
INJECTION, SOLUTION INTRAVENOUS AS NEEDED
Status: DISCONTINUED | OUTPATIENT
Start: 2021-04-10 | End: 2021-04-10 | Stop reason: SURG

## 2021-04-10 RX ORDER — GLYCOPYRROLATE 0.2 MG/ML
INJECTION INTRAMUSCULAR; INTRAVENOUS AS NEEDED
Status: DISCONTINUED | OUTPATIENT
Start: 2021-04-10 | End: 2021-04-10 | Stop reason: SURG

## 2021-04-10 RX ORDER — NEOSTIGMINE METHYLSULFATE 1 MG/ML
INJECTION, SOLUTION INTRAVENOUS AS NEEDED
Status: DISCONTINUED | OUTPATIENT
Start: 2021-04-10 | End: 2021-04-10 | Stop reason: SURG

## 2021-04-10 RX ORDER — FENTANYL CITRATE 50 UG/ML
INJECTION, SOLUTION INTRAMUSCULAR; INTRAVENOUS
Status: COMPLETED | OUTPATIENT
Start: 2021-04-10 | End: 2021-04-10

## 2021-04-10 RX ORDER — MAGNESIUM HYDROXIDE 1200 MG/15ML
LIQUID ORAL AS NEEDED
Status: DISCONTINUED | OUTPATIENT
Start: 2021-04-10 | End: 2021-04-10 | Stop reason: HOSPADM

## 2021-04-10 RX ORDER — BUPIVACAINE HYDROCHLORIDE 2.5 MG/ML
INJECTION, SOLUTION EPIDURAL; INFILTRATION; INTRACAUDAL
Status: COMPLETED | OUTPATIENT
Start: 2021-04-10 | End: 2021-04-10

## 2021-04-10 RX ORDER — IPRATROPIUM BROMIDE AND ALBUTEROL SULFATE 2.5; .5 MG/3ML; MG/3ML
3 SOLUTION RESPIRATORY (INHALATION) ONCE AS NEEDED
Status: DISCONTINUED | OUTPATIENT
Start: 2021-04-10 | End: 2021-04-10 | Stop reason: HOSPADM

## 2021-04-10 RX ORDER — DEXAMETHASONE SODIUM PHOSPHATE 4 MG/ML
INJECTION, SOLUTION INTRA-ARTICULAR; INTRALESIONAL; INTRAMUSCULAR; INTRAVENOUS; SOFT TISSUE AS NEEDED
Status: DISCONTINUED | OUTPATIENT
Start: 2021-04-10 | End: 2021-04-10 | Stop reason: SURG

## 2021-04-10 RX ORDER — ONDANSETRON 2 MG/ML
4 INJECTION INTRAMUSCULAR; INTRAVENOUS ONCE AS NEEDED
Status: DISCONTINUED | OUTPATIENT
Start: 2021-04-10 | End: 2021-04-10 | Stop reason: HOSPADM

## 2021-04-10 RX ORDER — PROPOFOL 10 MG/ML
VIAL (ML) INTRAVENOUS AS NEEDED
Status: DISCONTINUED | OUTPATIENT
Start: 2021-04-10 | End: 2021-04-10 | Stop reason: SURG

## 2021-04-10 RX ORDER — ONDANSETRON 2 MG/ML
INJECTION INTRAMUSCULAR; INTRAVENOUS AS NEEDED
Status: DISCONTINUED | OUTPATIENT
Start: 2021-04-10 | End: 2021-04-10 | Stop reason: SURG

## 2021-04-10 RX ORDER — EPHEDRINE SULFATE 50 MG/ML
INJECTION, SOLUTION INTRAVENOUS AS NEEDED
Status: DISCONTINUED | OUTPATIENT
Start: 2021-04-10 | End: 2021-04-10 | Stop reason: SURG

## 2021-04-10 RX ORDER — ROPIVACAINE HYDROCHLORIDE 2 MG/ML
6 INJECTION, SOLUTION EPIDURAL; INFILTRATION CONTINUOUS
Status: DISCONTINUED | OUTPATIENT
Start: 2021-04-10 | End: 2021-04-13 | Stop reason: HOSPADM

## 2021-04-10 RX ORDER — CARVEDILOL 3.12 MG/1
3.12 TABLET ORAL 2 TIMES DAILY WITH MEALS
Status: DISCONTINUED | OUTPATIENT
Start: 2021-04-10 | End: 2021-04-12

## 2021-04-10 RX ORDER — LIDOCAINE HYDROCHLORIDE 10 MG/ML
INJECTION, SOLUTION EPIDURAL; INFILTRATION; INTRACAUDAL; PERINEURAL AS NEEDED
Status: DISCONTINUED | OUTPATIENT
Start: 2021-04-10 | End: 2021-04-10 | Stop reason: SURG

## 2021-04-10 RX ORDER — SODIUM CHLORIDE, SODIUM LACTATE, POTASSIUM CHLORIDE, CALCIUM CHLORIDE 600; 310; 30; 20 MG/100ML; MG/100ML; MG/100ML; MG/100ML
9 INJECTION, SOLUTION INTRAVENOUS CONTINUOUS
Status: DISCONTINUED | OUTPATIENT
Start: 2021-04-10 | End: 2021-04-13 | Stop reason: HOSPADM

## 2021-04-10 RX ORDER — CEFAZOLIN SODIUM 1 G/3ML
INJECTION, POWDER, FOR SOLUTION INTRAMUSCULAR; INTRAVENOUS AS NEEDED
Status: DISCONTINUED | OUTPATIENT
Start: 2021-04-10 | End: 2021-04-10 | Stop reason: SURG

## 2021-04-10 RX ORDER — FAMOTIDINE 10 MG/ML
20 INJECTION, SOLUTION INTRAVENOUS ONCE
Status: COMPLETED | OUTPATIENT
Start: 2021-04-10 | End: 2021-04-10

## 2021-04-10 RX ORDER — BUPIVACAINE HCL/0.9 % NACL/PF 0.125 %
PLASTIC BAG, INJECTION (ML) EPIDURAL AS NEEDED
Status: DISCONTINUED | OUTPATIENT
Start: 2021-04-10 | End: 2021-04-10 | Stop reason: SURG

## 2021-04-10 RX ADMIN — CEFAZOLIN 1.5 G: 1 INJECTION, POWDER, FOR SOLUTION INTRAMUSCULAR; INTRAVENOUS at 17:42

## 2021-04-10 RX ADMIN — PHENYLEPHRINE HYDROCHLORIDE 0.5 MCG/KG/MIN: 10 INJECTION INTRAVENOUS at 09:11

## 2021-04-10 RX ADMIN — HEPARIN SODIUM 5000 UNITS: 5000 INJECTION INTRAVENOUS; SUBCUTANEOUS at 14:28

## 2021-04-10 RX ADMIN — EPHEDRINE SULFATE 5 MG: 50 INJECTION, SOLUTION INTRAVENOUS at 09:05

## 2021-04-10 RX ADMIN — DEXAMETHASONE SODIUM PHOSPHATE 8 MG: 4 INJECTION, SOLUTION INTRA-ARTICULAR; INTRALESIONAL; INTRAMUSCULAR; INTRAVENOUS; SOFT TISSUE at 09:10

## 2021-04-10 RX ADMIN — ROCURONIUM BROMIDE 50 MG: 10 INJECTION INTRAVENOUS at 09:01

## 2021-04-10 RX ADMIN — FENTANYL CITRATE 50 MCG: 50 INJECTION, SOLUTION INTRAMUSCULAR; INTRAVENOUS at 08:15

## 2021-04-10 RX ADMIN — FAMOTIDINE 20 MG: 10 INJECTION INTRAVENOUS at 08:12

## 2021-04-10 RX ADMIN — NEOSTIGMINE 3 MG: 1 INJECTION INTRAVENOUS at 10:25

## 2021-04-10 RX ADMIN — HYDROMORPHONE HYDROCHLORIDE 0.5 MG: 1 INJECTION, SOLUTION INTRAMUSCULAR; INTRAVENOUS; SUBCUTANEOUS at 04:49

## 2021-04-10 RX ADMIN — ONDANSETRON 4 MG: 2 INJECTION INTRAMUSCULAR; INTRAVENOUS at 10:24

## 2021-04-10 RX ADMIN — SUGAMMADEX 100 MG: 100 INJECTION, SOLUTION INTRAVENOUS at 10:49

## 2021-04-10 RX ADMIN — CIPROFLOXACIN 2 DROP: 3 SOLUTION OPHTHALMIC at 04:55

## 2021-04-10 RX ADMIN — CIPROFLOXACIN 2 DROP: 3 SOLUTION OPHTHALMIC at 14:29

## 2021-04-10 RX ADMIN — BUPIVACAINE HYDROCHLORIDE 15 ML: 2.5 INJECTION, SOLUTION EPIDURAL; INFILTRATION; INTRACAUDAL at 08:15

## 2021-04-10 RX ADMIN — CARVEDILOL 3.12 MG: 3.12 TABLET, FILM COATED ORAL at 18:33

## 2021-04-10 RX ADMIN — CEFAZOLIN SODIUM 2 G: 1 INJECTION, POWDER, FOR SOLUTION INTRAMUSCULAR; INTRAVENOUS at 09:15

## 2021-04-10 RX ADMIN — SODIUM CHLORIDE, POTASSIUM CHLORIDE, SODIUM LACTATE AND CALCIUM CHLORIDE: 600; 310; 30; 20 INJECTION, SOLUTION INTRAVENOUS at 10:05

## 2021-04-10 RX ADMIN — CIPROFLOXACIN 2 DROP: 3 SOLUTION OPHTHALMIC at 20:33

## 2021-04-10 RX ADMIN — PROPOFOL 80 MG: 10 INJECTION, EMULSION INTRAVENOUS at 09:01

## 2021-04-10 RX ADMIN — HEPARIN SODIUM 5000 UNITS: 5000 INJECTION INTRAVENOUS; SUBCUTANEOUS at 20:32

## 2021-04-10 RX ADMIN — CIPROFLOXACIN 2 DROP: 3 SOLUTION OPHTHALMIC at 17:42

## 2021-04-10 RX ADMIN — SODIUM CHLORIDE, POTASSIUM CHLORIDE, SODIUM LACTATE AND CALCIUM CHLORIDE: 600; 310; 30; 20 INJECTION, SOLUTION INTRAVENOUS at 08:56

## 2021-04-10 RX ADMIN — ONDANSETRON 4 MG: 2 INJECTION INTRAMUSCULAR; INTRAVENOUS at 04:54

## 2021-04-10 RX ADMIN — GLYCOPYRROLATE 0.4 MG: 0.4 INJECTION INTRAMUSCULAR; INTRAVENOUS at 10:25

## 2021-04-10 RX ADMIN — Medication 160 MCG: at 09:11

## 2021-04-10 RX ADMIN — LIDOCAINE HYDROCHLORIDE 50 MG: 10 INJECTION, SOLUTION EPIDURAL; INFILTRATION; INTRACAUDAL; PERINEURAL at 09:01

## 2021-04-10 RX ADMIN — POTASSIUM CHLORIDE AND SODIUM CHLORIDE 75 ML/HR: 900; 150 INJECTION, SOLUTION INTRAVENOUS at 12:28

## 2021-04-10 RX ADMIN — ROPIVACAINE HYDROCHLORIDE 6 ML/HR: 2 INJECTION, SOLUTION EPIDURAL; INFILTRATION at 10:54

## 2021-04-10 NOTE — ANESTHESIA PROCEDURE NOTES
Airway  Urgency: elective    Date/Time: 4/10/2021 9:02 AM  Airway not difficult    General Information and Staff    Patient location during procedure: OR  CRNA: Riki Huizar CRNA    Indications and Patient Condition  Indications for airway management: airway protection    Preoxygenated: yes  MILS not maintained throughout  Mask difficulty assessment: 0 - not attempted    Final Airway Details  Final airway type: endotracheal airway      Successful airway: ETT  Cuffed: yes   Successful intubation technique: direct laryngoscopy and RSI  Facilitating devices/methods: intubating stylet and cricoid pressure  Endotracheal tube insertion site: oral  Blade: Eagle  Blade size: 2  ETT size (mm): 7.0  Cormack-Lehane Classification: grade I - full view of glottis  Placement verified by: chest auscultation and capnometry   Cuff volume (mL): 6  Measured from: lips  ETT/EBT  to lips (cm): 20  Number of attempts at approach: 1  Assessment: lips, teeth, and gum same as pre-op and atraumatic intubation    Additional Comments  Negative epigastric sounds, Breath sound equal bilaterally with symmetric chest rise and fall. RSI with cricoid pressure until tube confirmation.

## 2021-04-10 NOTE — OP NOTE
HUMERUS PROXIMAL OPEN REDUCTION INTERNAL FIXATION  Procedure Report    Patient Name:  Andra Buchanan  YOB: 1931    Date of Surgery:  4/10/2021     Indications:  89 year old female with left shoulder pain after fall. Radiographic and clinical findings were consistent with a left proximal humerus fracture with significant displacement. We discussed the risks and benefits of performing an open reduction, internal fixation of the left proximal humerus. The risks and benefits were discussed with the patient to include, but not limited to: bleeding, infection, nerve injury, failure of fixation, AVN, need for further procedures, loss of limb or life. The patient expressed understanding and wished to proceed.      Pre-op Diagnosis:        left proximal humerus fracture      Post-op Diagnosis:         left proximal humerus fracture      Procedure/CPT® Codes:  13015    Procedure(s):  HUMERUS PROXIMAL OPEN REDUCTION INTERNAL FIXATION    Staff:  Surgeon(s):  Eddie Matos Jr., MD    Circulator: Anabela Lassiter RN  Radiology Technologist: Jadiel Dean RT  Scrub Person: Kristopher Maldonado  Vendor Representative: Guicho Pinto  Assistant: Nabil Miller PA     Assistant: Nabil Miller PA  was responsible for performing the following activities: Retraction, Suction, Irrigation, Suturing, Closing and Placing Dressing and their skilled assistance was necessary for the success of this case.    Anesthesia: General    Estimated Blood Loss: 75 mL    Implants:    Implant Name Type Inv. Item Serial No.  Lot No. LRB No. Used Action   NAIL IM HUM AFFIXUS NATURALNAIL PROX [id number] LT STRL - [id number] Implant NAIL IM HUM AFFIXUS NATURALNAIL PROX [id number] LT STRL  [employer] [id number] Left 1 Implanted   SCRW VIRGIE BONE AFFIXUSNATURALNAIL 4X28MM - [id number] Implant SCRW VIRGIE BONE AFFIXUSNATURALNAIL 4X28MM  [employer] [id number] Left 1 Implanted   SCRW BLNT/TP AFFIXUS 4X38MM - [id number] Implant  SCRW BLNT/TP AFFIXUS 4X38MM  [employer] [id number] Left 1 Implanted   affixus blunt tip screw    [employer]ashant [id number] Left 1 Implanted   SCRW BLNT/TP AFFIXUS 4X54MM - [id number] Implant SCRW BLNT/TP AFFIXUS 4X54MM  [employer] [id number] Left 1 Implanted       Specimen:                None      Findings: displaced 2 part prox humerus fracture    Complications: none apparent    Description of Procedure: On the day of surgery, we identified the left shoulder as the correct operative extremity. This was initialed by the surgeon with the patient's acknowledgment. The patient underwent placement of an interscalene block and was taken to the operating room and placed in the supine position. Upon induction of adequate anesthesia, the patient was brought up to the relaxed beach chair position (approximately 30 degrees) and the shoulder and upper extremity were prepped and draped in the usual sterile fashion. Timeout confirmed the correct patient and operative extremity as well as that antibiotics were on board.     We then closed reduced the proximal humerus fracture. K- wires were passed in an antegrade and retrograde manner from the shaft and into the head and head to shaft, respectively, taking care to ensure anatomic reduction of the calcar.  We then passed a starting guidewire through the rotator interval and inserted it onto the mid aspect of the humeral head.  Adequate starting point was confirmed fluoroscopically.  While holding the fracture reduced, we then passed starting guidewire down through the head and into the humeral shaft through the central aspect of the shaft. We then made a 2 cm incision around our starting guide wide and bluntly dissected down to the rotator interval.  The entry reamer was then passed over top of our guidewire and passed through the humeral head down to the surgical neck.  The entry wire and reamer were then removed.  We then inserted a ball-tipped guidewire through the head past the fracture  and down the humeral shaft, while holding the fracture anatomically reduced.  We then confirmed reduction of head and shaft on fluoroscopy.  The guide for our proximal and distal screws was then inserted onto the nail.  We then made percutaneous incisions through the lateral upper arm for placement of our interlocking screws.  We then placed 3screws proximally, taking care to ensure that our screws were of adequate length and did not penetrate the cortical surface of the humeral head.  Finally, one screws was placed into the distal aspect of the nail using our guide.  Final fluoroscopic views were then taken, demonstrating excellent reduction of our fracture with adequate spread of screws, calcar fixation, and adequate length without penetrating the articular surface. We then irrigated the wound thoroughly.      The fascia was approximated with 0 Vicryl, the subcutaneous tissue with 2-0 Vicryl, and the skin with Prineo dressing. A sterile dressing was placed. Anesthesia was reversed and the patient was taken to the recovery room in stable condition. All instrument, needle, and sponge counts were correct.          Eddie Matos Jr., MD     Date: 4/10/2021  Time: 10:47 EDT

## 2021-04-10 NOTE — ANESTHESIA POSTPROCEDURE EVALUATION
Patient: Andra Buchanan    Procedure Summary     Date: 04/10/21 Room / Location:  [facility] OR 10 /  [facility]    Anesthesia Start: 0856 Anesthesia Stop:     Procedure: HUMERUS PROXIMAL OPEN REDUCTION INTERNAL FIXATION WITH INTERMEDULARY NAIL (Left Arm Upper) Diagnosis:     Surgeons: Eddie Matos Jr., MD Provider: Odessa Ramey MD    Anesthesia Type: general with block ASA Status: 3          Anesthesia Type: general with block    Vitals  Vitals Value Taken Time   /61 04/10/21 1053   Temp     Pulse 87 04/10/21 1055   Resp     SpO2 96 % 04/10/21 1055   Vitals shown include unvalidated device data.        Post Anesthesia Care and Evaluation    Patient location during evaluation: PACU  Patient participation: complete - patient participated  Level of consciousness: awake and alert  Pain management: adequate  Airway patency: patent  Anesthetic complications: No anesthetic complications  PONV Status: none  Cardiovascular status: hemodynamically stable and acceptable  Respiratory status: nonlabored ventilation, acceptable and nasal cannula  Hydration status: acceptable

## 2021-04-10 NOTE — PROGRESS NOTES
Orthopedic Daily Progress Note      CC: GUILLE overnight. NPO since MN.    Pain well controlled  General: no fevers, chills  Abdomen: no nausea, vomiting, or diarrhea    No other complaints    Physical Exam:  I have reviewed the vital signs.  Temp:  [97.4 °F (36.3 °C)-98 °F (36.7 °C)] 98 °F (36.7 °C)  Heart Rate:  [82-92] 88  Resp:  [17-18] 18  BP: (121-171)/(51-79) 138/69    Objective  General Appearance:    Alert, cooperative, no distress  Extremities: No clubbing, cyanosis, or edema to lower extremities  Pulses:  2+ in distal surgical extremity        Results Review:    I have reviewed the labs, radiology results and diagnostic studies:Hgb 8.4    Results from last 7 days   Lab Units 04/10/21  0623   WBC 10*3/mm3 7.35   HEMOGLOBIN g/dL 8.4*   PLATELETS 10*3/mm3 440     Results from last 7 days   Lab Units 04/10/21  0623   SODIUM mmol/L 139   POTASSIUM mmol/L 4.5   CO2 mmol/L 29.0   CREATININE mg/dL 0.82   GLUCOSE mg/dL 104*       I have reviewed the medications.    Assessment/Problem List  DOS L IMN prox humerus    Plan  NWB LUE  NPO  Posted/consented/marked.  To OR today.        Eddie Matos Jr., MD  04/10/21  09:17 EDT

## 2021-04-10 NOTE — PLAN OF CARE
Goal Outcome Evaluation:  Plan of Care Reviewed With: patient  Progress: no change    PT HAS SLEPT INTERMITTENTLY. MEDICATED WITH IV DILAUDID FOR PAIN LEFT ARM. REMAINS ON 2L PER NC. VOIDING PER CONSTANCE. NPO FOR SURGERY TODAY.

## 2021-04-10 NOTE — PROGRESS NOTES
[facility] Medicine Services  PROGRESS NOTE    Patient Name: Andra Buchanan  : 1931  MRN: [id number]    Date of Admission: 2021  Primary Care Physician: Provider, No Known    Subjective   Subjective     CC:  F/U L proximal humerus fracture     HPI:  Pt seen and examined. RN notes reviewed. No acute events overnight. Pt just got back to her room from surgery. Denies pain, complains of nausea.     ROS:  Gen- No fevers, chills  CV- No chest pain, palpitations  Resp- No cough, dyspnea  GI- No V/D, abd pain, +nausea    Objective   Objective     Vital Signs:   Temp:  [97.1 °F (36.2 °C)-98.2 °F (36.8 °C)] 97.5 °F (36.4 °C)  Heart Rate:  [69-92] 78  Resp:  [16-18] 18  BP: (121-171)/(51-79) 151/64        Physical Exam:  Constitutional: No acute distress, awake, alert, has blanket wrapped around her head   HENT: NCAT, mucous membranes moist, L eye conjunctivitis   Respiratory: Clear to auscultation bilaterally, respiratory effort normal   Cardiovascular: RRR, no murmurs, rubs, or gallops  Gastrointestinal: Positive bowel sounds, soft, nontender, nondistended  Musculoskeletal: No bilateral ankle edema, L arm in sling  Psychiatric: Appropriate affect, cooperative  Neurologic: Oriented x 3, no focal deficits   Skin: No rashes    Results Reviewed:  Results from last 7 days   Lab Units 04/10/21  0623 21  1359   WBC 10*3/mm3 7.35 26.50*   HEMOGLOBIN g/dL 8.4* 9.5*   HEMATOCRIT % 28.0* 30.1*   PLATELETS 10*3/mm3 440 554*     Results from last 7 days   Lab Units 04/10/21  0623 21  1359   SODIUM mmol/L 139 139   POTASSIUM mmol/L 4.5 3.4*   CHLORIDE mmol/L 104 101   CO2 mmol/L 29.0 27.0   BUN mg/dL 17 16   CREATININE mg/dL 0.82 0.98   GLUCOSE mg/dL 104* 141*   CALCIUM mg/dL 8.5* 9.3   ALT (SGPT) U/L  --  9   AST (SGOT) U/L  --  17   TROPONIN T ng/mL  --  <0.010     Estimated Creatinine Clearance: 34.7 mL/min (by C-G formula based on SCr of 0.82 mg/dL).    Microbiology Results Abnormal      Procedure Component Value - Date/Time    COVID PRE-OP / PRE-PROCEDURE SCREENING ORDER (NO ISOLATION) - Swab, Nasopharynx [[id number]]  (Normal) Collected: 04/09/21 1358    Lab Status: Final result Specimen: Swab from Nasopharynx Updated: 04/09/21 1458    Narrative:      The following orders were created for panel order COVID PRE-OP / PRE-PROCEDURE SCREENING ORDER (NO ISOLATION) - Swab, Nasopharynx.  Procedure                               Abnormality         Status                     ---------                               -----------         ------                     COVID-19 and FLU A/B PCR...[[id number]]  Normal              Final result                 Please view results for these tests on the individual orders.    COVID-19 and FLU A/B PCR - Swab, Nasopharynx [[id number]]  (Normal) Collected: 04/09/21 1358    Lab Status: Final result Specimen: Swab from Nasopharynx Updated: 04/09/21 1458     COVID19 Not Detected     Influenza A PCR Not Detected     Influenza B PCR Not Detected    Narrative:      Fact sheet for providers: https://www.fda.gov/media/061426/download    Fact sheet for patients: https://www.fda.gov/media/269516/download    Test performed by PCR.          Imaging Results (Last 24 Hours)     Procedure Component Value Units Date/Time    FL C Arm During Surgery [[id number]] Collected: 04/10/21 1111     Updated: 04/10/21 1115    Narrative:      EXAMINATION: FL C ARM DURING SURGERY-      INDICATION: left humerus fx; S42.212A-Unspecified displaced fracture of  surgical neck of left humerus, initial encounter for closed fracture;  D72.829-Elevated white blood cell count, unspecified      COMPARISON: NONE     FINDINGS: 2 minutes 57 seconds of fluoroscopy time provided with 4  images stored during left humerus ORIF       Impression:      2 minutes 57 seconds of fluoroscopy time provided with 4  images stored during left humerus ORIF     This report was finalized on 4/10/2021 11:12 AM by Rodrigo Hall.                  I have reviewed the medications:  Scheduled Meds:ceFAZolin, 1.5 g, Intravenous, Q8H  ciprofloxacin, 2 drop, Left Eye, Q4H While Awake  heparin (porcine), 5,000 Units, Subcutaneous, Q8H  sodium chloride, 10 mL, Intravenous, Q12H      Continuous Infusions:lactated ringers, 9 mL/hr, Last Rate: Stopped (04/10/21 1031)  ropivacaine (NAROPIN) 0.2% peripheral nerve cath (moog), 6 mL/hr, Last Rate: 6 mL/hr (04/10/21 1054)  sodium chloride 0.9 % with KCl 20 mEq, 75 mL/hr, Last Rate: 75 mL/hr (04/10/21 1228)      PRN Meds:.•  acetaminophen  •  HYDROcodone-acetaminophen  •  HYDROmorphone  •  [DISCONTINUED] Morphine **AND** naloxone  •  ondansetron **OR** ondansetron  •  sodium chloride    Assessment/Plan   Assessment & Plan     Active Hospital Problems    Diagnosis  POA   • **Closed displaced fracture of surgical neck of left humerus [S42.212A]  Yes   • Anemia [D64.9]  Yes   • Leukocytosis [D72.829]  Yes   • Acute conjunctivitis of left eye [H10.32]  Yes      Resolved Hospital Problems   No resolved problems to display.        Brief Hospital Course to date:  Andra Buchanan is a 89 y.o. female presents after a fall while using her walker and was found to have a left humeral neck fracture.    This patient's problems and plans were partially entered by my partner and updated as appropriate by me 04/10/21.  All problems are new to me today      Left humeral neck fracture  -S/P ORIF with Dr Matos 4/10  -Pain Control  -NWB to LUE  -PT/OT     Leukocytosis  -Resolved, likely reactive   -UA and CXR negative      Anemia  -Chronic per pt, no overt bleeding  -Defer extensive work-up to outpatient based on her goals of care  -CBC in AM      Left eye conjunctivitis  -Has been using an unknown ointment, started on Cipro drops on admission, continue    DVT Prophylaxis:  KONSTANTIN    Disposition: I expect the patient to be discharged TBD. Pending PT/OT evals    CODE STATUS:   Code Status and Medical Interventions:   Ordered at:  04/09/21 1529     Limited Support to NOT Include:    Intubation    Cardioversion/Defibrillation     Level Of Support Discussed With:    Patient     Code Status:    No CPR     Medical Interventions (Level of Support Prior to Arrest):    Gurwinder Feldman DO  04/10/21

## 2021-04-10 NOTE — ANESTHESIA PROCEDURE NOTES
Left interscalene catheter      Patient reassessed immediately prior to procedure    Patient location during procedure: pre-op  Reason for block: at surgeon's request and post-op pain management  Performed by  CRNA: Riki Huizar CRNA  Assisted by: Odessa Ramey MD  Preanesthetic Checklist  Completed: patient identified, IV checked, site marked, risks and benefits discussed, surgical consent, monitors and equipment checked, pre-op evaluation and timeout performed  Prep:  Pt Position: right lateral decubitus  Sterile barriers:cap, gloves, mask and sterile barriers  Prep: ChloraPrep  Patient monitoring: blood pressure monitoring, continuous pulse oximetry and EKG  Procedure  Sedation:yes  Performed under: local infiltration  Guidance:ultrasound guided  Images:still images obtained, printed/placed on chart    Laterality:left  Block Type:interscalene  Injection Technique:catheter  Needle Type:Tuohy and echogenic  Needle Gauge:18 G  Resistance on Injection: none  Catheter Size:20 G (20g)  Cath Depth at skin: 6 cm    Medications Used: fentaNYL citrate (PF) (SUBLIMAZE) injection, 50 mcg  bupivacaine PF (MARCAINE) 0.25 % injection, 15 mL      Post Assessment  Injection Assessment: negative aspiration for heme, no paresthesia on injection and incremental injection  Patient Tolerance:comfortable throughout block  Complications:no  Additional Notes  Procedure:                 The pt was placed in semifowlers position with a slight tilt of the thorax contralateral to the insertion site.  The Insertion Site was prepped and draped in sterile fashion.  The pt was anesthetized with  IV Sedation( see meds) and  Skin and cutaneous tissue was infiltrated and anesthetized with 1% Lidocaine 3 mls via a 25g needle.  Utilizing ultrasound guidance, a BBraun 4 inch 18 g Contiplex echogenic touhy needle was advanced in-plane.  Hydro dissection of tissue was achieved with Normal saline. Major vessels(carotid and Internal Jugular) where  Order changed and signed   visualized as the brachial plexus was approached at the approximate level of C-7/ T-1.  Cervical 5 and Branches of Cervical 6 nerve roots where visualized and the needle tip was placed posterior at the level of C-6 roots.  LA spread was visualized and injection was made incrementally every 5 mls with aspiration. Injection pressure was normal or little, there was no intraneural injection, no vascular injection.      The BBraun 20 g wire stylet catheter was then placed under US guidance on the posterior aspect of the Brachial Plexus. The tuohy was removed and the location of catheter was confirmed with NS injection visualized with US . The skin was sealed with exofin tissue adhesive at catheter insertion site.  Skin was prepped with benzoin and the catheter was secured with steristrips and a CHG tegaderm. Appropriate labels applied. Thank You.

## 2021-04-10 NOTE — ANESTHESIA PROCEDURE NOTES
Peripheral IV    Patient location during procedure: pre-op  Line placed for Difficult Access.  Performed By   Anesthesiologist: Odessa Ramey MD  Preanesthetic Checklist  Completed: patient identified, IV checked, site marked, risks and benefits discussed, surgical consent, monitors and equipment checked, pre-op evaluation and timeout performed  Peripheral IV Prep   Patient position: supine   Prep: ChloraPrep  Peripheral IV Procedure   Laterality:right  Location:  Arm  Catheter size: 18 G         Post Assessment   Dressing Type: tape and transparent.    IV Dressing/Site: clean, dry and intact

## 2021-04-10 NOTE — PLAN OF CARE
Goal Outcome Evaluation:  Plan of Care Reviewed With: patient, daughter  Progress: improving   Pt vss; alert and oriented generally-some confusion right after surgery; ropivocaine @ 6 ml/hr; turned q2; surgery on left shoulder went well today-dressing cdi; only voiding small amounts. IV fluids running. Scds on and waffle mattress on as well.

## 2021-04-11 LAB
ANION GAP SERPL CALCULATED.3IONS-SCNC: 6 MMOL/L (ref 5–15)
BUN SERPL-MCNC: 16 MG/DL (ref 8–23)
BUN/CREAT SERPL: 18.4 (ref 7–25)
CALCIUM SPEC-SCNC: 9 MG/DL (ref 8.6–10.5)
CHLORIDE SERPL-SCNC: 105 MMOL/L (ref 98–107)
CO2 SERPL-SCNC: 27 MMOL/L (ref 22–29)
CREAT SERPL-MCNC: 0.87 MG/DL (ref 0.57–1)
DEPRECATED RDW RBC AUTO: 43.8 FL (ref 37–54)
ERYTHROCYTE [DISTWIDTH] IN BLOOD BY AUTOMATED COUNT: 13 % (ref 12.3–15.4)
GFR SERPL CREATININE-BSD FRML MDRD: 61 ML/MIN/1.73
GFR SERPL CREATININE-BSD FRML MDRD: 74 ML/MIN/1.73
GLUCOSE SERPL-MCNC: 110 MG/DL (ref 65–99)
HCT VFR BLD AUTO: 24.2 % (ref 34–46.6)
HCT VFR BLD AUTO: 29.9 % (ref 34–46.6)
HGB BLD-MCNC: 7.4 G/DL (ref 12–15.9)
HGB BLD-MCNC: 9.5 G/DL (ref 12–15.9)
MCH RBC QN AUTO: 28.2 PG (ref 26.6–33)
MCHC RBC AUTO-ENTMCNC: 30.6 G/DL (ref 31.5–35.7)
MCV RBC AUTO: 92.4 FL (ref 79–97)
PLATELET # BLD AUTO: 411 10*3/MM3 (ref 140–450)
PMV BLD AUTO: 10.2 FL (ref 6–12)
POTASSIUM SERPL-SCNC: 4.9 MMOL/L (ref 3.5–5.2)
RBC # BLD AUTO: 2.62 10*6/MM3 (ref 3.77–5.28)
SODIUM SERPL-SCNC: 138 MMOL/L (ref 136–145)
WBC # BLD AUTO: 10.18 10*3/MM3 (ref 3.4–10.8)

## 2021-04-11 PROCEDURE — 85014 HEMATOCRIT: CPT | Performed by: FAMILY MEDICINE

## 2021-04-11 PROCEDURE — 25010000002 ROPIVACAINE PER 1 MG: Performed by: ORTHOPAEDIC SURGERY

## 2021-04-11 PROCEDURE — 36430 TRANSFUSION BLD/BLD COMPNT: CPT

## 2021-04-11 PROCEDURE — 85018 HEMOGLOBIN: CPT | Performed by: FAMILY MEDICINE

## 2021-04-11 PROCEDURE — 85027 COMPLETE CBC AUTOMATED: CPT | Performed by: FAMILY MEDICINE

## 2021-04-11 PROCEDURE — 80048 BASIC METABOLIC PNL TOTAL CA: CPT | Performed by: FAMILY MEDICINE

## 2021-04-11 PROCEDURE — 97110 THERAPEUTIC EXERCISES: CPT

## 2021-04-11 PROCEDURE — P9016 RBC LEUKOCYTES REDUCED: HCPCS

## 2021-04-11 PROCEDURE — 86900 BLOOD TYPING SEROLOGIC ABO: CPT

## 2021-04-11 PROCEDURE — 25010000002 HEPARIN (PORCINE) PER 1000 UNITS: Performed by: ORTHOPAEDIC SURGERY

## 2021-04-11 PROCEDURE — 25010000003 CEFAZOLIN PER 500 MG: Performed by: ORTHOPAEDIC SURGERY

## 2021-04-11 PROCEDURE — 97165 OT EVAL LOW COMPLEX 30 MIN: CPT

## 2021-04-11 PROCEDURE — 97161 PT EVAL LOW COMPLEX 20 MIN: CPT

## 2021-04-11 PROCEDURE — 97530 THERAPEUTIC ACTIVITIES: CPT

## 2021-04-11 PROCEDURE — 99232 SBSQ HOSP IP/OBS MODERATE 35: CPT | Performed by: FAMILY MEDICINE

## 2021-04-11 PROCEDURE — 94799 UNLISTED PULMONARY SVC/PX: CPT

## 2021-04-11 PROCEDURE — 25810000003 SODIUM CHLORIDE 0.9 % WITH KCL 20 MEQ 20-0.9 MEQ/L-% SOLUTION: Performed by: ORTHOPAEDIC SURGERY

## 2021-04-11 RX ADMIN — ROPIVACAINE HYDROCHLORIDE 6 ML/HR: 2 INJECTION, SOLUTION EPIDURAL; INFILTRATION at 14:27

## 2021-04-11 RX ADMIN — HEPARIN SODIUM 5000 UNITS: 5000 INJECTION INTRAVENOUS; SUBCUTANEOUS at 14:27

## 2021-04-11 RX ADMIN — HEPARIN SODIUM 5000 UNITS: 5000 INJECTION INTRAVENOUS; SUBCUTANEOUS at 22:09

## 2021-04-11 RX ADMIN — POTASSIUM CHLORIDE AND SODIUM CHLORIDE 75 ML/HR: 900; 150 INJECTION, SOLUTION INTRAVENOUS at 01:47

## 2021-04-11 RX ADMIN — CIPROFLOXACIN 2 DROP: 3 SOLUTION OPHTHALMIC at 14:27

## 2021-04-11 RX ADMIN — CIPROFLOXACIN 2 DROP: 3 SOLUTION OPHTHALMIC at 05:27

## 2021-04-11 RX ADMIN — HYDROCODONE BITARTRATE AND ACETAMINOPHEN 1 TABLET: 5; 325 TABLET ORAL at 22:17

## 2021-04-11 RX ADMIN — CARVEDILOL 3.12 MG: 3.12 TABLET, FILM COATED ORAL at 09:02

## 2021-04-11 RX ADMIN — CIPROFLOXACIN 2 DROP: 3 SOLUTION OPHTHALMIC at 22:09

## 2021-04-11 RX ADMIN — CARVEDILOL 3.12 MG: 3.12 TABLET, FILM COATED ORAL at 17:41

## 2021-04-11 RX ADMIN — CIPROFLOXACIN 2 DROP: 3 SOLUTION OPHTHALMIC at 17:41

## 2021-04-11 RX ADMIN — HEPARIN SODIUM 5000 UNITS: 5000 INJECTION INTRAVENOUS; SUBCUTANEOUS at 05:27

## 2021-04-11 RX ADMIN — CIPROFLOXACIN 2 DROP: 3 SOLUTION OPHTHALMIC at 09:03

## 2021-04-11 RX ADMIN — CEFAZOLIN 1.5 G: 1 INJECTION, POWDER, FOR SOLUTION INTRAMUSCULAR; INTRAVENOUS at 01:48

## 2021-04-11 RX ADMIN — SODIUM CHLORIDE, PRESERVATIVE FREE 10 ML: 5 INJECTION INTRAVENOUS at 22:10

## 2021-04-11 RX ADMIN — HYDROCODONE BITARTRATE AND ACETAMINOPHEN 1 TABLET: 5; 325 TABLET ORAL at 12:05

## 2021-04-11 NOTE — PLAN OF CARE
Goal Outcome Evaluation:  Plan of Care Reviewed With: patient, daughter  Progress: improving  Outcome Summary: OT eval complete. Pt very pleasent and cooperative. Pt requires modA for all bed mobility and transfers. Pt with intermittent loss of balance while sitting EOB requiring modA to maintain and regain balance at times. Pt and daughter educated on sling wear and that sling can be removed in sitting/supine and when nerve catheter d/c. Educated on dof/donning sling and precious dressing technique. Pt electing to keep sling doffed when sitting in chair. At this time due pts history of frequent falls recommend d/c to IPR when medically ready.

## 2021-04-11 NOTE — PLAN OF CARE
Patient alert and oriented x 4. Received 1 unit prbc's today, tolerated well. VSS, on room air. Patient had complaints of pain this afternoon, received a norco and increased ropivacaine per order. Pt's hand now has increased numbness, blocked turned back down to 6ml/hr, and pain relieved.

## 2021-04-11 NOTE — THERAPY EVALUATION
Patient Name: Andra Buchanan  : 1931    MRN: [id number]                              Today's Date: 2021       Admit Date: 2021    Visit Dx:     ICD-10-CM ICD-9-CM   1. Closed displaced fracture of surgical neck of left humerus, unspecified fracture morphology, initial encounter  S42.212A 812.01   2. Leukocytosis, unspecified type  D72.829 288.60     Patient Active Problem List   Diagnosis   • Closed displaced fracture of surgical neck of left humerus   • Anemia   • Leukocytosis   • Acute conjunctivitis of left eye     Past Medical History:   Diagnosis Date   • Arthritis    • Breast cancer (CMS/HCC)    • Hypertension      Past Surgical History:   Procedure Laterality Date   • CHOLECYSTECTOMY     • DILATATION AND CURETTAGE     • FRACTURE SURGERY      Rt hip   • HYSTERECTOMY     • JOINT REPLACEMENT     • MASTECTOMY Right     2008   • MASTECTOMY       General Information     Row Name 21 142          Physical Therapy Time and Intention    Document Type  evaluation  -LO     Mode of Treatment  individual therapy;physical therapy  -     Row Name 21 142          General Information    Patient Profile Reviewed  yes  -LO     Prior Level of Function  min assist:;gait;transfer;bed mobility;community mobility  -LO     Existing Precautions/Restrictions  fall;other (see comments)  -LO     Barriers to Rehab  previous functional deficit  -LO     Row Name 21 1425          Living Environment    Lives With  child(drew), adult  -     Row Name 21 142          Home Main Entrance    Number of Stairs, Main Entrance  three  -LO     Stair Railings, Main Entrance  railing on left side (ascending)  -LO     Row Name 21 1425          Stairs Within Home, Primary    Number of Stairs, Within Home, Primary  none  -LO     Row Name 21 142          Cognition    Orientation Status (Cognition)  oriented x 4  -LO     Row Name 21 1427 21 142       Safety Issues, Functional Mobility     Safety Issues Affecting Function (Mobility)  --  insight into deficits/self-awareness;sequencing abilities  -    Impairments Affecting Function (Mobility)  --  balance;endurance/activity tolerance;strength  -    Comment, Safety Issues/Impairments (Mobility)  vc for safety during bed mobility and transfers with LUE  -LO  --      User Key  (r) = Recorded By, (t) = Taken By, (c) = Cosigned By    Initials Name Provider Type     Sera Kaba, PT Physical Therapist        Mobility     Row Name 04/11/21 1427          Bed Mobility    Bed Mobility  scooting/bridging;supine-sit;sit-sidelying  -LO     Scooting/Bridging [place] (Bed Mobility)  moderate assist (50% patient effort);1 person assist;verbal cues  -LO     Supine-Sit [place] (Bed Mobility)  moderate assist (50% patient effort);1 person assist;verbal cues  -LO     Sit-Sidelying [place] (Bed Mobility)  minimum assist (75% patient effort);verbal cues  -LO     Assistive Device (Bed Mobility)  bed rails;draw sheet;head of bed elevated  -     Comment (Bed Mobility)  modA supine>sit edge of bed and Juany sitting EOB>supine with vc for hand placement and BLE advancement  -     Row Name 04/11/21 1427          Transfers    Comment (Transfers)  Juany transfers EOB <>FWW with vc for hand placement on the R. LUE currently numb ( RN just arrived to turn pain pump down) and difficulty to position on walker  -     Row Name 04/11/21 1427          Sit-Stand Transfer    Sit-Stand [place] (Transfers)  moderate assist (50% patient effort);1 person assist;verbal cues  -LO     Assistive Device (Sit-Stand Transfers)  walker, front-wheeled  -     Row Name 04/11/21 1427          Gait/Stairs (Locomotion)    Comment (Gait/Stairs)  gait deferred today due to several variables; patient receiving transfusion and requested not to walk, level of unsteadiness in standing at walker.  -     Row Name 04/11/21 1427          Mobility    Extremity Weight-bearing Status   left upper extremity  -LO     Left Upper Extremity (Weight-bearing Status)  weight-bearing as tolerated (WBAT)  -LO       User Key  (r) = Recorded By, (t) = Taken By, (c) = Cosigned By    Initials Name Provider Type    Sera Floyd, PT Physical Therapist        Obj/Interventions     Row Name 04/11/21 1433          Range of Motion Comprehensive    General Range of Motion  bilateral lower extremity ROM WNL  -LO     Row Name 04/11/21 1433          Strength Comprehensive (MMT)    General Manual Muscle Testing (MMT) Assessment  lower extremity strength deficits identified  -LO     Comment, General Manual Muscle Testing (MMT) Assessment  BLE grossly 4-/5 except L hip flexion 3-/5  -LO     Row Name 04/11/21 1433          Balance    Balance Assessment  sitting static balance;sitting dynamic balance;standing static balance;standing dynamic balance  -LO     Static Sitting Balance  moderate impairment;supported;sitting, edge of bed  -LO     Dynamic Sitting Balance  moderate impairment;supported;sitting, edge of bed  -LO     Static Standing Balance  moderate impairment;supported;standing  -LO     Dynamic Standing Balance  not tested  -LO     Comment, Balance  significant unsteadiness in both supported sitting and standing, requiring both physical assist and vc  -LO     Row Name 04/11/21 1433          Sensory Assessment (Somatosensory)    Sensory Assessment (Somatosensory)  LE sensation intact  -LO       User Key  (r) = Recorded By, (t) = Taken By, (c) = Cosigned By    Initials Name Provider Type    Sera Floyd, PT Physical Therapist        Goals/Plan     Row Name 04/11/21 1439          Bed Mobility Goal 1 (PT)    Activity/Assistive Device (Bed Mobility Goal 1, PT)  rolling to right;sidelying to sit/sit to sidelying  -     [place] Level/Cues Needed (Bed Mobility Goal 1, PT)  modified independence  -LO     Time Frame (Bed Mobility Goal 1, PT)  long term goal (LTG);10 days  -     Row Name 04/11/21 1395           Transfer Goal 1 (PT)    Activity/Assistive Device (Transfer Goal 1, PT)  sit-to-stand/stand-to-sit;car transfer;walker, rolling  -LO     [place] Level/Cues Needed (Transfer Goal 1, PT)  modified independence  -LO     Time Frame (Transfer Goal 1, PT)  long term goal (LTG);10 days  -LO     Row Name 04/11/21 1439          Gait Training Goal 1 (PT)    Activity/Assistive Device (Gait Training Goal 1, PT)  gait (walking locomotion);decrease fall risk;diminish gait deviation;increase endurance/gait distance;improve balance and speed;forward stepping;walker, rolling  -LO     [place] Level (Gait Training Goal 1, PT)  modified independence  -LO     Distance (Gait Training Goal 1, PT)  50'  -LO     Row Name 04/11/21 1439          Stairs Goal 1 (PT)    Activity/Assistive Device (Stairs Goal 1, PT)  ascending stairs;descending stairs;using handrail, left;cane, straight  -LO     [place] Level/Cues Needed (Stairs Goal 1, PT)  contact guard assist  -LO     Number of Stairs (Stairs Goal 1, PT)  3  -LO     Time Frame (Stairs Goal 1, PT)  long term goal (LTG)  -LO     Row Name 04/11/21 1439          Patient Education Goal (PT)    Activity (Patient Education Goal, PT)  Patient will demonstrate safe and effective sequencing for bed mobility and transfers.  -LO     Time Frame (Patient Education Goal, PT)  long term goal (LTG);10 days  -LO       User Key  (r) = Recorded By, (t) = Taken By, (c) = Cosigned By    Initials Name Provider Type    Sera Floyd, PT Physical Therapist        Clinical Impression     Row Name 04/11/21 1438          Pain Scale: Numbers Pre/Post-Treatment    Pretreatment Pain Rating  0/10 - no pain  -LO     Posttreatment Pain Rating  0/10 - no pain  -LO     Row Name 04/11/21 1439          Plan of Care Review    Plan of Care Reviewed With  patient  -LO     Progress  no change  -LO     Outcome Summary  PT eval completed. Patient alert and oriented x4. Presents with L shoulder ORIF from fall at home.  Deficits noted in BLE strength, sitting balance, standing balance, effecting functional mobility. Assist x 1 for all mobility. Gait deferred today due to level of unsteadiness in sitting and standing. Patient will benefit from skilled IP PT services to address impairments in order to return to [facility]. Recommend SNF at [place].  -LO     Row Name 04/11/21 1435          Therapy Assessment/Plan (PT)    Patient/Family Therapy Goals Statement (PT)  get better  -LO     Rehab Potential (PT)  good, to achieve stated therapy goals  -LO     Criteria for Skilled Interventions Met (PT)  yes  -LO     Row Name 04/11/21 1435          Vital Signs    Intra Systolic BP Rehab  139  -LO     Intra Treatment Diastolic BP  64  -LO     Pretreatment Heart Rate (beats/min)  93  -LO     Pre SpO2 (%)  96  -LO     O2 Delivery Pre Treatment  room air  -LO     O2 Delivery Intra Treatment  room air  -LO     O2 Delivery Post Treatment  room air  -LO     Pre Patient Position  Supine  -LO     Intra Patient Position  Sitting  -LO     Post Patient Position  Supine  -LO     Row Name 04/11/21 1435          Positioning and Restraints    Pre-Treatment Position  in bed  -LO     Post Treatment Position  bed  -LO     In Bed  notified nsg;supine;exit alarm on;encouraged to call for assist;call light within reach  -LO       User Key  (r) = Recorded By, (t) = Taken By, (c) = Cosigned By    Initials Name Provider Type    Sera Floyd, PT Physical Therapist        Outcome Measures     Row Name 04/11/21 1441          How much help from another person do you currently need...    Turning from your back to your side while in flat bed without using bedrails?  2  -LO     Moving from lying on back to sitting on the side of a flat bed without bedrails?  2  -LO     Moving to and from a bed to a chair (including a wheelchair)?  2  -LO     Standing up from a chair using your arms (e.g., wheelchair, bedside chair)?  2  -LO     Climbing 3-5 steps with a railing?  1  -LO     To walk  in hospital room?  1  -     AM-PAC 6 Clicks Score (PT)  10  -LO     Row Name 04/11/21 1441          Functional Assessment    Outcome Measure Options  AM-PAC 6 Clicks Basic Mobility (PT)  -       User Key  (r) = Recorded By, (t) = Taken By, (c) = Cosigned By    Initials Name Provider Type    Sera Floyd, PT Physical Therapist        Physical Therapy Education                 Title: PT OT SLP Therapies (Done)     Topic: Physical Therapy (Done)     Point: Mobility training (Done)     Learning Progress Summary           Patient Acceptance, E, VU,NR by  at 4/11/2021 1356    Comment: Patient education regarding sequening for bed mobility and transfers.                   Point: Home exercise program (Done)     Learning Progress Summary           Patient Acceptance, E, VU,NR by  at 4/11/2021 1356    Comment: Patient education regarding sequening for bed mobility and transfers.                   Point: Body mechanics (Done)     Learning Progress Summary           Patient Acceptance, E, VU,NR by  at 4/11/2021 1356    Comment: Patient education regarding sequening for bed mobility and transfers.                   Point: Precautions (Done)     Learning Progress Summary           Patient Acceptance, E, VU,NR by  at 4/11/2021 1356    Comment: Patient education regarding sequening for bed mobility and transfers.                               User Key     Initials Effective Dates Name Provider Type Discipline     03/11/20 -  Sera Kaba, AVERY Physical Therapist PT              PT Recommendation and Plan  Planned Therapy Interventions (PT): balance training, bed mobility training, gait training, home exercise program, patient/family education, neuromuscular re-education, stair training, strengthening, transfer training  Plan of Care Reviewed With: patient  Progress: no change  Outcome Summary: PT eval completed. Patient alert and oriented x4. Presents with L shoulder ORIF from fall at home. Deficits noted in BLE  strength, sitting balance, standing balance, effecting functional mobility. Assist x 1 for all mobility. Gait deferred today due to level of unsteadiness in sitting and standing. Patient will benefit from skilled IP PT services to address impairments in order to return to [facility]. Recommend SNF at [place].     Time Calculation:   PT Charges     Row Name 04/11/21 1356             Time Calculation    Start Time  1356  -LO      PT Received On  04/11/21  -LO      PT Goal Re-Cert Due Date  04/21/21  -LO         Timed Charges    09953 - PT Therapeutic Activity Minutes  12  -LO        User Key  (r) = Recorded By, (t) = Taken By, (c) = Cosigned By    Initials Name Provider Type    LO Sera Kaba, PT Physical Therapist        Therapy Charges for Today     Code Description Service Date Service Provider Modifiers Qty    62709088181 HC PT THERAPEUTIC ACT EA 15 MIN 4/11/2021 Sera Kaba, PT GP 1    19319169568 HC PT EVAL LOW COMPLEXITY 3 4/11/2021 Sera Kaba, PT GP 1          PT G-Codes  Outcome Measure Options: AM-PAC 6 Clicks Basic Mobility (PT)  AM-PAC 6 Clicks Score (PT): 10  AM-PAC 6 Clicks Score (OT): 12    Sera Kaba PT  4/11/2021

## 2021-04-11 NOTE — THERAPY EVALUATION
Patient Name: Andra Buchanan  : 1931    MRN: [id number]                              Today's Date: 2021       Admit Date: 2021    Visit Dx:     ICD-10-CM ICD-9-CM   1. Closed displaced fracture of surgical neck of left humerus, unspecified fracture morphology, initial encounter  S42.212A 812.01   2. Leukocytosis, unspecified type  D72.829 288.60     Patient Active Problem List   Diagnosis   • Closed displaced fracture of surgical neck of left humerus   • Anemia   • Leukocytosis   • Acute conjunctivitis of left eye     Past Medical History:   Diagnosis Date   • Arthritis    • Breast cancer (CMS/HCC)    • Hypertension      Past Surgical History:   Procedure Laterality Date   • CHOLECYSTECTOMY     • DILATATION AND CURETTAGE     • FRACTURE SURGERY      Rt hip   • HYSTERECTOMY     • JOINT REPLACEMENT     • MASTECTOMY Right     2008   • MASTECTOMY       General Information     Row Name 21 1004          OT Time and Intention    Document Type  evaluation  -HK     Mode of Treatment  occupational therapy  -     Row Name 21 1004          General Information    Patient Profile Reviewed  yes  -HK     Prior Level of Function  min assist:;all household mobility;gait;transfer;bed mobility;ADL's Per daughter some days pt requires physical assist some days she does not dependening on how she feels.  -HK     Existing Precautions/Restrictions  fall;other (see comments) WBAT AND ROM as tolerated LUE per Dr. Matos.  -HK     Barriers to Rehab  previous functional deficit  -     Row Name 21 1004          Living Environment    Lives With  child(drew), adult  -     Row Name 21 1004          Home Main Entrance    Number of Stairs, Main Entrance  three  -HK     Stair Railings, Main Entrance  railing on left side (ascending)  -     Row Name 21 1004          Stairs Within Home, Primary    Number of Stairs, Within Home, Primary  none  -HK     Stair Railings, Within Home, Primary  none  -HK      Stairs Comment, Within Home, Primary  Pt has tub shower. Daughter and pt educated on need for tub bench for safety getting in and out of tub. Daughter educated on purchase place and price.  -     Row Name 04/11/21 1004          Cognition    Orientation Status (Cognition)  oriented x 4  -     Row Name 04/11/21 1004          Safety Issues, Functional Mobility    Safety Issues Affecting Function (Mobility)  safety precautions follow-through/compliance;safety precaution awareness  -     Impairments Affecting Function (Mobility)  balance;endurance/activity tolerance;range of motion (ROM);strength  -       User Key  (r) = Recorded By, (t) = Taken By, (c) = Cosigned By    Initials Name Provider Type     Cassandra Escobar, OT Occupational Therapist          Mobility/ADL's     Row Name 04/11/21 1006          Bed Mobility    Bed Mobility  scooting/bridging;supine-sit  -HK     Scooting/Bridging [place] (Bed Mobility)  moderate assist (50% patient effort);1 person assist;verbal cues  -     Supine-Sit [place] (Bed Mobility)  moderate assist (50% patient effort);1 person assist;verbal cues  -     Bed Mobility, Safety Issues  decreased use of arms for pushing/pulling;decreased use of legs for bridging/pushing;impaired trunk control for bed mobility  -     Assistive Device (Bed Mobility)  bed rails;draw sheet;head of bed elevated  -     Comment (Bed Mobility)  Pt required modA to advance to EOB. Cues to advance legs to EOB and for hand placement. Pt initially maintaining static sitting balance well however intermittent periods of modA to maintain or regain balance.  -     Row Name 04/11/21 1006          Transfers    Transfers  sit-stand transfer;bed-chair transfer  -     Comment (Transfers)  Verbal cues for safe hand placement and sequencing.  -     Bed-Chair [place] (Transfers)  moderate assist (50% patient effort);1 person assist;verbal cues  -     Assistive Device (Bed-Chair Transfers)  --  R UE support  -     Sit-Stand [place] (Transfers)  moderate assist (50% patient effort);1 person assist;verbal cues  -[facility] Name 04/11/21 1006          Sit-Stand Transfer    Assistive Device (Sit-Stand Transfers)  other (see comments) UE support  -[facility] Name 04/11/21 1006          Functional Mobility    Functional Mobility- Ind. Level  not tested  -[facility] Name 04/11/21 1006          Activities of Daily Living    BADL Assessment/Intervention  upper body dressing  -[facility] Name 04/11/21 1006          Mobility    Extremity Weight-bearing Status  left upper extremity  -HK     Left Upper Extremity (Weight-bearing Status)  weight-bearing as tolerated (WBAT) WBAT and ROM as tolerated  -[facility] Name 04/11/21 1006          Upper Body Dressing Assessment/Training    [place] Level (Upper Body Dressing)  doff;don;other (see comments);maximum assist (25% patient effort) sling  -HK     Position (Upper Body Dressing)  supported sitting  -HK     Comment (Upper Body Dressing)  Pt and daughter educated on sling management as well as wear schedule and care, precautions, precious dressing, and care of nerve catheter during ADLS. Pt maxA for all sling management this date. Pt educated sling is for comfort and can be removed in sitting/supine and when nerve catheter is d/c.  -       User Key  (r) = Recorded By, (t) = Taken By, (c) = Cosigned By    Initials Name Provider Type     Cassandra Escobar, OT Occupational Therapist        Obj/Interventions     [facility] Name 04/11/21 1013          Sensory Assessment (Somatosensory)    Sensory Assessment (Somatosensory)  left UE  -HK     Left UE Sensory Assessment  general sensation  -     Sensory Subjective Reports  numbness  -[facility] Name 04/11/21 1013          Vision Assessment/Intervention    Visual Impairment/Limitations  WFL  -[facility] Name 04/11/21 1013          Range of Motion Comprehensive    Comment, General Range of Motion  L UE with nerve catheter; R UE WFL  "for eval  -     Row Name 04/11/21 1013          Strength Comprehensive (MMT)    Comment, General Manual Muscle Testing (MMT) Assessment  L UE with nerve catheter; R UE WFL for eval  -     Row Name 04/11/21 1013          Shoulder (Therapeutic Exercise)    Shoulder (Therapeutic Exercise)  AAROM (active assistive range of motion)  -     Shoulder AAROM (Therapeutic Exercise)  left;flexion;extension;10 repetitions;sitting  -     Row Name 04/11/21 1013          Elbow/Forearm (Therapeutic Exercise)    Elbow/Forearm (Therapeutic Exercise)  AAROM (active assistive range of motion)  -     Elbow/Forearm AAROM (Therapeutic Exercise)  left;flexion;extension;supination;pronation;10 repetitions  -     Row Name 04/11/21 1013          Wrist (Therapeutic Exercise)    Wrist (Therapeutic Exercise)  AAROM (active assistive range of motion)  -     Wrist AAROM (Therapeutic Exercise)  left;flexion;extension;10 repetitions  -     Row Name 04/11/21 1013          Hand (Therapeutic Exercise)    Hand (Therapeutic Exercise)  other (see comments) Pt reports prior tendon release in L hand however has difficulty completing flexion and extension of 3rd, 4th, and 5th digits. Tendon \"popping\" with passive movement.  -     Row Name 04/11/21 1013          Motor Skills    Motor Skills  coordination  -     Coordination  fine motor deficit;left;upper extremity  -     Row Name 04/11/21 1013          Balance    Balance Assessment  sitting static balance;sitting dynamic balance;standing static balance  -     Static Sitting Balance  moderate impairment;sitting, edge of bed;unsupported periods of CGA; intermittent loss of balance  -     Dynamic Sitting Balance  moderate impairment;unsupported;sitting, edge of bed  -     Static Standing Balance  moderate impairment;supported;standing  -     Comment, Balance  Per daughter pt has had increasing balance deficits with frequent falls.  -     Row Name 04/11/21 1013          Therapeutic " Exercise    Therapeutic Exercise  elbow/forearm;shoulder;hand;wrist  -HK       User Key  (r) = Recorded By, (t) = Taken By, (c) = Cosigned By    Initials Name Provider Type    Cassandra Vanegas OT Occupational Therapist        Goals/Plan     Row Name 04/11/21 1023          Bed Mobility Goal 1 (OT)    Activity/Assistive Device (Bed Mobility Goal 1, OT)  sit to supine/supine to sit;scooting  -HK     [place] Level/Cues Needed (Bed Mobility Goal 1, OT)  minimum assist (75% or more patient effort);verbal cues required  -HK     Time Frame (Bed Mobility Goal 1, OT)  by discharge  -HK     Progress/Outcomes (Bed Mobility Goal 1, OT)  goal ongoing  -     Row Name 04/11/21 1023          Transfer Goal 1 (OT)    Activity/Assistive Device (Transfer Goal 1, OT)  sit-to-stand/stand-to-sit;bed-to-chair/chair-to-bed  -HK     [place] Level/Cues Needed (Transfer Goal 1, OT)  minimum assist (75% or more patient effort);verbal cues required  -HK     Time Frame (Transfer Goal 1, OT)  by discharge  -HK     Progress/Outcome (Transfer Goal 1, OT)  goal ongoing  -     Row Name 04/11/21 1023          Dressing Goal 1 (OT)    Activity/Device (Dressing Goal 1, OT)  upper body dressing  -HK     [place]/Cues Needed (Dressing Goal 1, OT)  moderate assist (50-74% patient effort);verbal cues required  -HK     Time Frame (Dressing Goal 1, OT)  by discharge  -HK     Strategies/Barriers (Dressing Goal 1, OT)  precious dressing technique  -HK     Progress/Outcome (Dressing Goal 1, OT)  goal ongoing  -     Row Name 04/11/21 1023          ROM Goal 1 (OT)    ROM Goal 1 (OT)  Pt will be able to complete L UE HEP with minimal assist from OT.  -HK     Time Frame (ROM Goal 1, OT)  by discharge  -HK     Progress/Outcome (ROM Goal 1, OT)  goal ongoing  -HK       User Key  (r) = Recorded By, (t) = Taken By, (c) = Cosigned By    Initials Name Provider Type    Cassandra Vanegas OT Occupational Therapist        Clinical Impression     Row Name  04/11/21 1020          Pain Assessment    Additional Documentation  Pain Scale: Numbers Pre/Post-Treatment (Group)  -     Row Name 04/11/21 1020          Pain Scale: Numbers Pre/Post-Treatment    Pretreatment Pain Rating  0/10 - no pain  -HK     Posttreatment Pain Rating  0/10 - no pain  -HK     Row Name 04/11/21 1020          Plan of Care Review    Plan of Care Reviewed With  patient;daughter  -     Progress  improving  -     Outcome Summary  OT eval complete. Pt very pleasent and cooperative. Pt requires modA for all bed mobility and transfers. Pt with intermittent loss of balance while sitting EOB requiring modA to maintain and regain balance at times. Pt and daughter educated on sling wear and that sling can be removed in sitting/supine and when nerve catheter d/c. Educated on dof/donning sling and precious dressing technique. Pt electing to keep sling doffed when sitting in chair. At this time due pts history of frequent falls recommend d/c to IPR when medically ready.  -     Row Name 04/11/21 1020          Therapy Assessment/Plan (OT)    Patient/Family Therapy Goal Statement (OT)  Pt would like to improve and return home.  -HK     Rehab Potential (OT)  good, to achieve stated therapy goals  -     Criteria for Skilled Therapeutic Interventions Met (OT)  yes;skilled treatment is necessary  -HK     Therapy Frequency (OT)  daily  -     Row Name 04/11/21 1020          Therapy Plan Review/Discharge Plan (OT)    Anticipated Discharge Disposition (OT)  inpatient rehabilitation facility  -     Row Name 04/11/21 1020          Vital Signs    Pre Systolic BP Rehab  -- RN cleared for tx; BP monitored during session and WFL.  -HK     O2 Delivery Pre Treatment  room air  -HK     O2 Delivery Intra Treatment  room air  -HK     O2 Delivery Post Treatment  room air  -HK     Pre Patient Position  Supine  -HK     Intra Patient Position  Standing  -HK     Post Patient Position  Sitting  -     Row Name 04/11/21 1020           Positioning and Restraints    Pre-Treatment Position  in bed  -HK     Post Treatment Position  chair  -HK     In Chair  notified nsg;reclined;call light within reach;encouraged to call for assist;exit alarm on;with family/caregiver;waffle cushion;LUE elevated  -HK       User Key  (r) = Recorded By, (t) = Taken By, (c) = Cosigned By    Initials Name Provider Type    Cassandra Vanegas, OT Occupational Therapist        Outcome Measures     Row Name 04/11/21 1024          How much help from another is currently needed...    Putting on and taking off regular lower body clothing?  1  -HK     Bathing (including washing, rinsing, and drying)  1  -HK     Toileting (which includes using toilet bed pan or urinal)  2  -HK     Putting on and taking off regular upper body clothing  2  -HK     Taking care of personal grooming (such as brushing teeth)  3  -HK     Eating meals  3  -HK     AM-PAC 6 Clicks Score (OT)  12  -HK     Row Name 04/11/21 1024          Functional Assessment    Outcome Measure Options  AM-PAC 6 Clicks Daily Activity (OT)  -HK       User Key  (r) = Recorded By, (t) = Taken By, (c) = Cosigned By    Initials Name Provider Type    Cassandra Vanegas, OT Occupational Therapist        Occupational Therapy Education                 Title: PT OT SLP Therapies (Done)     Topic: Occupational Therapy (Done)     Point: ADL training (Done)     Description:   Instruct learner(s) on proper safety adaptation and remediation techniques during self care or transfers.   Instruct in proper use of assistive devices.              Learning Progress Summary           Patient Acceptance, E,D,TB,H, VU,NR by  at 4/11/2021 1025   Family Acceptance, E,D,TB,H, VU,NR by  at 4/11/2021 1025                   Point: Home exercise program (Done)     Description:   Instruct learner(s) on appropriate technique for monitoring, assisting and/or progressing therapeutic exercises/activities.              Learning Progress Summary            Patient Acceptance, E,D,TB,H, VU,NR by  at 4/11/2021 1025   Family Acceptance, E,D,TB,H, VU,NR by HK at 4/11/2021 1025                   Point: Precautions (Done)     Description:   Instruct learner(s) on prescribed precautions during self-care and functional transfers.              Learning Progress Summary           Patient Acceptance, E,D,TB,H, VU,NR by HK at 4/11/2021 1025   Family Acceptance, E,D,TB,H, VU,NR by HK at 4/11/2021 1025                   Point: Body mechanics (Done)     Description:   Instruct learner(s) on proper positioning and spine alignment during self-care, functional mobility activities and/or exercises.              Learning Progress Summary           Patient Acceptance, E,D,TB,H, VU,NR by  at 4/11/2021 1025   Family Acceptance, E,D,TB,H, VU,NR by  at 4/11/2021 1025                               User Key     Initials Effective Dates Name Provider Type Discipline     03/07/18 -  Cassandra Escobar, OT Occupational Therapist OT              OT Recommendation and Plan  Therapy Frequency (OT): daily  Plan of Care Review  Plan of Care Reviewed With: patient, daughter  Progress: improving  Outcome Summary: OT eval complete. Pt very pleasent and cooperative. Pt requires modA for all bed mobility and transfers. Pt with intermittent loss of balance while sitting EOB requiring modA to maintain and regain balance at times. Pt and daughter educated on sling wear and that sling can be removed in sitting/supine and when nerve catheter d/c. Educated on dof/donning sling and precious dressing technique. Pt electing to keep sling doffed when sitting in chair. At this time due pts history of frequent falls recommend d/c to IPR when medically ready.     Time Calculation:   Time Calculation- OT     Row Name 04/11/21 0915             Time Calculation- OT    OT Start Time  0915  -      OT Received On  04/11/21  -      OT Goal Re-Cert Due Date  04/21/21  [facility]         Timed Charges    62310 - OT Therapeutic  Exercise Minutes  13  -HK        User Key  (r) = Recorded By, (t) = Taken By, (c) = Cosigned By    Initials Name Provider Type     Cassandra Escobar, OT Occupational Therapist        Therapy Charges for Today     Code Description Service Date Service Provider Modifiers Qty    81760972083  OT THER PROC EA 15 MIN 4/11/2021 Cassandra Escobar, OT GO 1    [id number]  OT EVAL LOW COMPLEXITY 3 4/11/2021 Cassandra Escobar OT GO 1               Cassandra Escobar OT  4/11/2021

## 2021-04-11 NOTE — PROGRESS NOTES
Orthopedic Daily Progress Note      CC: GUILLE overnight.    Pain well controlled  General: no fevers, chills  Abdomen: no nausea, vomiting, or diarrhea    No other complaints    Physical Exam:  I have reviewed the vital signs.  Temp:  [97.1 °F (36.2 °C)-98.2 °F (36.8 °C)] 97.7 °F (36.5 °C)  Heart Rate:  [] 103  Resp:  [16-18] 16  BP: (122-178)/(56-92) 138/58    Objective  General Appearance:    Alert, cooperative, no distress  Extremities: No clubbing, cyanosis, or edema to lower extremities  Pulses:  2+ in distal surgical extremity  Skin: Incision Clean/dry/intact      Results Review:    I have reviewed the labs, radiology results and diagnostic studies:Hgb 7.4    Results from last 7 days   Lab Units 04/11/21  0644   WBC 10*3/mm3 10.18   HEMOGLOBIN g/dL 7.4*   PLATELETS 10*3/mm3 411     Results from last 7 days   Lab Units 04/11/21  0644   SODIUM mmol/L 138   POTASSIUM mmol/L 4.9   CO2 mmol/L 27.0   CREATININE mg/dL 0.87   GLUCOSE mg/dL 110*       I have reviewed the medications.    Assessment/Problem List  POD# 1 Day Post-Op   S/p intramedullary nail left proximal humerus  Postop blood loss anemia    Plan  Weightbearing as tolerated and range of motion as tolerated left upper extremity  Patient did receive 1 unit of packed red blood cells for anemia  PT and OT consults      Discharge Planning: I expect patient to be discharged to TBD in TBD days.    Eddie Matos Jr., MD  04/11/21  09:45 EDT

## 2021-04-11 NOTE — PLAN OF CARE
Goal Outcome Evaluation:  Plan of Care Reviewed With: patient  Progress: no change  Outcome Summary: PT eval completed. Patient alert and oriented x4. Presents with L shoulder ORIF from fall at home. Deficits noted in BLE strength, sitting balance, standing balance, effecting functional mobility. Assist x 1 for all mobility. Gait deferred today due to level of unsteadiness in sitting and standing. Patient will benefit from skilled IP PT services to address impairments in order to return to PLOF. Recommend SNF at [place].

## 2021-04-11 NOTE — PROGRESS NOTES
[facility] Medicine Services  PROGRESS NOTE    Patient Name: Andra Buchanan  : 1931  MRN: [id number]    Date of Admission: 2021  Primary Care Physician: Provider, No Known    Subjective   Subjective     CC:  F/U L proximal humerus fracture     HPI:  Pt seen and examined. RN notes reviewed. No acute events overnight. Daughter at bedside. Patient states she didn't sleep well last night. We discussed her low H&H. Pt agreeable to blood transfusion today.     ROS:  Gen- No fevers, chills  CV- No chest pain, palpitations  Resp- No cough, dyspnea  GI- No V/D, abd pain, nausea    Objective   Objective     Vital Signs:   Temp:  [97.1 °F (36.2 °C)-98.2 °F (36.8 °C)] 97.5 °F (36.4 °C)  Heart Rate:  [] 83  Resp:  [16-18] 16  BP: (122-178)/(50-92) 128/50        Physical Exam:  Constitutional: No acute distress, awake, alert  HENT: NCAT, mucous membranes moist, L eye conjunctivitis improved  Respiratory: Clear to auscultation bilaterally, respiratory effort normal   Cardiovascular: RRR, no murmurs, rubs, or gallops  Gastrointestinal: Positive bowel sounds, soft, nontender, nondistended  Musculoskeletal: No bilateral ankle edema, L arm in sling, able to wiggle fingers. Capillary refill <2 seconds   Psychiatric: Appropriate affect, cooperative  Neurologic: Oriented x 3, no focal deficits   Skin: No rashes    Results Reviewed:  Results from last 7 days   Lab Units 21  0644 04/10/21  0623 04/09/21  1359   WBC 10*3/mm3 10.18 7.35 26.50*   HEMOGLOBIN g/dL 7.4* 8.4* 9.5*   HEMATOCRIT % 24.2* 28.0* 30.1*   PLATELETS 10*3/mm3 411 440 554*     Results from last 7 days   Lab Units 21  0644 04/10/21  0623 04/09/21  1359   SODIUM mmol/L 138 139 139   POTASSIUM mmol/L 4.9 4.5 3.4*   CHLORIDE mmol/L 105 104 101   CO2 mmol/L 27.0 29.0 27.0   BUN mg/dL 16 17 16   CREATININE mg/dL 0.87 0.82 0.98   GLUCOSE mg/dL 110* 104* 141*   CALCIUM mg/dL 9.0 8.5* 9.3   ALT (SGPT) U/L  --   --  9   AST  (SGOT) U/L  --   --  17   TROPONIN T ng/mL  --   --  <0.010     Estimated Creatinine Clearance: 32.7 mL/min (by C-G formula based on SCr of 0.87 mg/dL).    Microbiology Results Abnormal     Procedure Component Value - Date/Time    COVID PRE-OP / PRE-PROCEDURE SCREENING ORDER (NO ISOLATION) - Swab, Nasopharynx [[id number]]  (Normal) Collected: 04/09/21 1358    Lab Status: Final result Specimen: Swab from Nasopharynx Updated: 04/09/21 1458    Narrative:      The following orders were created for panel order COVID PRE-OP / PRE-PROCEDURE SCREENING ORDER (NO ISOLATION) - Swab, Nasopharynx.  Procedure                               Abnormality         Status                     ---------                               -----------         ------                     COVID-19 and FLU A/B PCR...[[id number]]  Normal              Final result                 Please view results for these tests on the individual orders.    COVID-19 and FLU A/B PCR - Swab, Nasopharynx [[id number]]  (Normal) Collected: 04/09/21 1358    Lab Status: Final result Specimen: Swab from Nasopharynx Updated: 04/09/21 1458     COVID19 Not Detected     Influenza A PCR Not Detected     Influenza B PCR Not Detected    Narrative:      Fact sheet for providers: https://www.fda.gov/media/606981/download    Fact sheet for patients: https://www.fda.gov/media/526296/download    Test performed by PCR.          Imaging Results (Last 24 Hours)     ** No results found for the last 24 hours. **              I have reviewed the medications:  Scheduled Meds:carvedilol, 3.125 mg, Oral, BID With Meals  ciprofloxacin, 2 drop, Left Eye, Q4H While Awake  heparin (porcine), 5,000 Units, Subcutaneous, Q8H  sodium chloride, 10 mL, Intravenous, Q12H      Continuous Infusions:lactated ringers, 9 mL/hr, Last Rate: Stopped (04/10/21 1031)  ropivacaine (NAROPIN) 0.2% peripheral nerve cath (moog), 6 mL/hr, Last Rate: 6 mL/hr (04/10/21 1054)  sodium chloride 0.9 % with KCl 20 mEq, 75 mL/hr,  Last Rate: 75 mL/hr (04/11/21 0147)      PRN Meds:.•  acetaminophen  •  HYDROcodone-acetaminophen  •  HYDROmorphone  •  [DISCONTINUED] Morphine **AND** naloxone  •  ondansetron **OR** ondansetron  •  sodium chloride    Assessment/Plan   Assessment & Plan     Active Hospital Problems    Diagnosis  POA   • **Closed displaced fracture of surgical neck of left humerus [S42.212A]  Yes   • Anemia [D64.9]  Yes   • Leukocytosis [D72.829]  Yes   • Acute conjunctivitis of left eye [H10.32]  Yes      Resolved Hospital Problems   No resolved problems to display.        Brief Hospital Course to date:  Andra Buchanan is a 89 y.o. female presents after a fall while using her walker and was found to have a left humeral neck fracture.    This patient's problems and plans were partially entered by my partner and updated as appropriate by me 04/11/21.     Left humeral neck fracture  -S/P intramedullary nail left proximal humerus with Dr Matos 4/10  -Pain Control  -WBAT and ROM as tolerated LUE   -OT recommending rehab, awaiting PT eval      Leukocytosis  -Resolved, likely reactive   -UA and CXR negative      Acute on Chronic Anemia/Post op blood loss anemia  -Pt agreeable to transfusion  -Will transfuse 1 unit PRBCs today      Left eye conjunctivitis  -Continue Cipro gtt     Sinus Tachycardia  -Discussed with daughter/patient, she does have history of elevated resting HR  -EKG reviewed  -Coreg added, continue     DVT Prophylaxis:  KONSTANTIN    Disposition: I expect the patient to be discharged TBD. Pending PT eval. Pt likely to need rehab.     CODE STATUS:   Code Status and Medical Interventions:   Ordered at: 04/09/21 1529     Limited Support to NOT Include:    Intubation    Cardioversion/Defibrillation     Level Of Support Discussed With:    Patient     Code Status:    No CPR     Medical Interventions (Level of Support Prior to Arrest):    Limited       Dolores Feldman DO  04/11/21

## 2021-04-11 NOTE — PLAN OF CARE
Goal Outcome Evaluation:  Plan of Care Reviewed With: patient  Progress: no change    PT AWAKE MOST OF NIGHT, CURRENTLY ASLEEP. REMAINS ON RA. VOIDING PER CONSTANCE. DENIES NEED FOR PRN PAIN MED. ROPIVICAINE AT 6ML INFUSING TO LEFT SHOULDER/ARM.

## 2021-04-11 NOTE — PROGRESS NOTES
[facility]    Acute pain service Inpatient Progress Note    Patient Name: Andra Buchanan  :  1931  MRN:  [id number]        Acute Pain  Service Inpatient Progress Note:    Analgesia:Excellent  Pain Score:0/10  LOC: alert and awake  Resp Status: room air  Cardiac: VS stable  Side Effects:None  Catheter Site:clean, dressing intact and dry  Cath type: peripheral nerve cath with ON Q  Infusion rate: 6ml/hr  Dosing/Volume: ropivacaine 0.2%  Catheter Plan:Catheter to remain Insitu and Continue catheter infusion rate unchanged

## 2021-04-12 LAB
BH BB BLOOD EXPIRATION DATE: NORMAL
BH BB BLOOD TYPE BARCODE: 9500
BH BB DISPENSE STATUS: NORMAL
BH BB PRODUCT CODE: NORMAL
BH BB UNIT NUMBER: NORMAL
CROSSMATCH INTERPRETATION: NORMAL
QT INTERVAL: 310 MS
QTC INTERVAL: 445 MS
SARS-COV-2 RNA RESP QL NAA+PROBE: NOT DETECTED
UNIT  ABO: NORMAL
UNIT  RH: NORMAL

## 2021-04-12 PROCEDURE — U0005 INFEC AGEN DETEC AMPLI PROBE: HCPCS | Performed by: FAMILY MEDICINE

## 2021-04-12 PROCEDURE — 25010000002 ONDANSETRON PER 1 MG: Performed by: ORTHOPAEDIC SURGERY

## 2021-04-12 PROCEDURE — U0003 INFECTIOUS AGENT DETECTION BY NUCLEIC ACID (DNA OR RNA); SEVERE ACUTE RESPIRATORY SYNDROME CORONAVIRUS 2 (SARS-COV-2) (CORONAVIRUS DISEASE [COVID-19]), AMPLIFIED PROBE TECHNIQUE, MAKING USE OF HIGH THROUGHPUT TECHNOLOGIES AS DESCRIBED BY CMS-2020-01-R: HCPCS | Performed by: FAMILY MEDICINE

## 2021-04-12 PROCEDURE — 97530 THERAPEUTIC ACTIVITIES: CPT

## 2021-04-12 PROCEDURE — 97110 THERAPEUTIC EXERCISES: CPT

## 2021-04-12 PROCEDURE — 25010000002 HEPARIN (PORCINE) PER 1000 UNITS: Performed by: ORTHOPAEDIC SURGERY

## 2021-04-12 PROCEDURE — 97110 THERAPEUTIC EXERCISES: CPT | Performed by: OCCUPATIONAL THERAPIST

## 2021-04-12 PROCEDURE — 99232 SBSQ HOSP IP/OBS MODERATE 35: CPT | Performed by: FAMILY MEDICINE

## 2021-04-12 PROCEDURE — 25010000002 ROPIVACAINE PER 1 MG: Performed by: ORTHOPAEDIC SURGERY

## 2021-04-12 PROCEDURE — 94799 UNLISTED PULMONARY SVC/PX: CPT

## 2021-04-12 RX ORDER — BISACODYL 10 MG
10 SUPPOSITORY, RECTAL RECTAL DAILY PRN
Status: DISCONTINUED | OUTPATIENT
Start: 2021-04-12 | End: 2021-04-13 | Stop reason: HOSPADM

## 2021-04-12 RX ORDER — METOPROLOL TARTRATE 5 MG/5ML
2.5 INJECTION INTRAVENOUS ONCE
Status: COMPLETED | OUTPATIENT
Start: 2021-04-12 | End: 2021-04-12

## 2021-04-12 RX ORDER — LOSARTAN POTASSIUM 25 MG/1
25 TABLET ORAL
Status: DISCONTINUED | OUTPATIENT
Start: 2021-04-13 | End: 2021-04-13 | Stop reason: HOSPADM

## 2021-04-12 RX ORDER — ACETAMINOPHEN 500 MG
500 TABLET ORAL NIGHTLY
COMMUNITY
End: 2021-04-13 | Stop reason: HOSPADM

## 2021-04-12 RX ORDER — AMLODIPINE BESYLATE 5 MG/1
5 TABLET ORAL
Status: DISCONTINUED | OUTPATIENT
Start: 2021-04-12 | End: 2021-04-12

## 2021-04-12 RX ORDER — ALBUTEROL SULFATE 90 UG/1
2 AEROSOL, METERED RESPIRATORY (INHALATION) EVERY 4 HOURS PRN
COMMUNITY

## 2021-04-12 RX ORDER — CARVEDILOL 6.25 MG/1
6.25 TABLET ORAL 2 TIMES DAILY WITH MEALS
Status: DISCONTINUED | OUTPATIENT
Start: 2021-04-12 | End: 2021-04-13 | Stop reason: HOSPADM

## 2021-04-12 RX ORDER — SODIUM PHOSPHATE,MONO-DIBASIC 19G-7G/118
1 ENEMA (ML) RECTAL ONCE
Status: COMPLETED | OUTPATIENT
Start: 2021-04-12 | End: 2021-04-12

## 2021-04-12 RX ADMIN — METOPROLOL TARTRATE 2.5 MG: 5 INJECTION INTRAVENOUS at 05:17

## 2021-04-12 RX ADMIN — CARVEDILOL 6.25 MG: 6.25 TABLET, FILM COATED ORAL at 09:08

## 2021-04-12 RX ADMIN — ACETAMINOPHEN 650 MG: 325 TABLET ORAL at 14:11

## 2021-04-12 RX ADMIN — Medication 1 ENEMA: at 21:08

## 2021-04-12 RX ADMIN — HEPARIN SODIUM 5000 UNITS: 5000 INJECTION INTRAVENOUS; SUBCUTANEOUS at 14:11

## 2021-04-12 RX ADMIN — ROPIVACAINE HYDROCHLORIDE 6 ML/HR: 2 INJECTION, SOLUTION EPIDURAL; INFILTRATION at 17:35

## 2021-04-12 RX ADMIN — CIPROFLOXACIN 2 DROP: 3 SOLUTION OPHTHALMIC at 21:08

## 2021-04-12 RX ADMIN — CIPROFLOXACIN 2 DROP: 3 SOLUTION OPHTHALMIC at 14:12

## 2021-04-12 RX ADMIN — HEPARIN SODIUM 5000 UNITS: 5000 INJECTION INTRAVENOUS; SUBCUTANEOUS at 21:08

## 2021-04-12 RX ADMIN — HYDROCODONE BITARTRATE AND ACETAMINOPHEN 1 TABLET: 5; 325 TABLET ORAL at 06:13

## 2021-04-12 RX ADMIN — ROPIVACAINE HYDROCHLORIDE 6 ML/HR: 2 INJECTION, SOLUTION EPIDURAL; INFILTRATION at 03:59

## 2021-04-12 RX ADMIN — BISACODYL 10 MG: 10 SUPPOSITORY RECTAL at 14:11

## 2021-04-12 RX ADMIN — HYDROCODONE BITARTRATE AND ACETAMINOPHEN 1 TABLET: 5; 325 TABLET ORAL at 22:10

## 2021-04-12 RX ADMIN — CIPROFLOXACIN 2 DROP: 3 SOLUTION OPHTHALMIC at 09:08

## 2021-04-12 RX ADMIN — AMLODIPINE BESYLATE 5 MG: 5 TABLET ORAL at 09:08

## 2021-04-12 RX ADMIN — CARVEDILOL 6.25 MG: 6.25 TABLET, FILM COATED ORAL at 17:35

## 2021-04-12 RX ADMIN — CIPROFLOXACIN 2 DROP: 3 SOLUTION OPHTHALMIC at 17:35

## 2021-04-12 RX ADMIN — HEPARIN SODIUM 5000 UNITS: 5000 INJECTION INTRAVENOUS; SUBCUTANEOUS at 05:18

## 2021-04-12 RX ADMIN — ONDANSETRON 4 MG: 2 INJECTION INTRAMUSCULAR; INTRAVENOUS at 09:45

## 2021-04-12 RX ADMIN — SODIUM CHLORIDE, PRESERVATIVE FREE 10 ML: 5 INJECTION INTRAVENOUS at 09:09

## 2021-04-12 RX ADMIN — CIPROFLOXACIN 2 DROP: 3 SOLUTION OPHTHALMIC at 05:18

## 2021-04-12 NOTE — THERAPY TREATMENT NOTE
Patient Name: Andra Buchanan  : 1931    MRN: [id number]                              Today's Date: 2021       Admit Date: 2021    Visit Dx:     ICD-10-CM ICD-9-CM   1. Closed displaced fracture of surgical neck of left humerus, unspecified fracture morphology, initial encounter  S42.212A 812.01   2. Leukocytosis, unspecified type  D72.829 288.60     Patient Active Problem List   Diagnosis   • Closed displaced fracture of surgical neck of left humerus   • Anemia   • Leukocytosis   • Acute conjunctivitis of left eye     Past Medical History:   Diagnosis Date   • Arthritis    • Breast cancer (CMS/HCC)    • Hypertension      Past Surgical History:   Procedure Laterality Date   • CHOLECYSTECTOMY     • DILATATION AND CURETTAGE     • FRACTURE SURGERY      Rt hip   • HYSTERECTOMY     • JOINT REPLACEMENT     • MASTECTOMY Right     2008   • MASTECTOMY       General Information     Row Name 21          Physical Therapy Time and Intention    Document Type  therapy note (daily note)  -     Mode of Treatment  physical therapy  -     Row Name 21          General Information    Patient Profile Reviewed  yes  -     Existing Precautions/Restrictions  fall;other (see comments) interscalene nerve cath  -     Row Name 21          Cognition    Orientation Status (Cognition)  oriented x 3  -     Row Name 21          Safety Issues, Functional Mobility    Safety Issues Affecting Function (Mobility)  insight into deficits/self-awareness;sequencing abilities;safety precaution awareness;safety precautions follow-through/compliance  -     Impairments Affecting Function (Mobility)  balance;endurance/activity tolerance;strength  -       User Key  (r) = Recorded By, (t) = Taken By, (c) = Cosigned By    Initials Name Provider Type     Anthony Servin PT Physical Therapist        Mobility     Row Name 21          Bed Mobility    Bed Mobility   supine-sit;scooting/bridging  -     Scooting/Bridging [place] (Bed Mobility)  moderate assist (50% patient effort);verbal cues;2 person assist  -     Supine-Sit [place] (Bed Mobility)  moderate assist (50% patient effort);1 person assist;verbal cues  -     Assistive Device (Bed Mobility)  bed rails;draw sheet;head of bed elevated  -     Comment (Bed Mobility)  Verbal cues for using RUE to reach across body for bed rails and assist at posterior trunk required.  -     Row Name 04/12/21 0905          Transfers    Comment (Transfers)  Verbal cues for safe hand placement and positioning of BLE prior to standing for safety and efficiency.  -     Row Name 04/12/21 0905          Bed-Chair Transfer    Bed-Chair [place] (Transfers)  moderate assist (50% patient effort);2 person assist;verbal cues  -     Assistive Device (Bed-Chair Transfers)  other (see comments) RUE support  -     Row Name 04/12/21 0905          Sit-Stand Transfer    Sit-Stand [place] (Transfers)  moderate assist (50% patient effort);2 person assist  -     Assistive Device (Sit-Stand Transfers)  other (see comments) RUE support  -     Row Name 04/12/21 0905          Gait/Stairs (Locomotion)    [place] Level (Gait)  moderate assist (50% patient effort);2 person assist;verbal cues  -     Assistive Device (Gait)  other (see comments) E support  -     Distance in Feet (Gait)  20  -     Deviations/Abnormal Patterns (Gait)  bilateral deviations;cuba decreased;festinating/shuffling;gait speed decreased;stride length decreased;weight shifting decreased;base of support, narrow  -     Bilateral Gait Deviations  forward flexed posture;heel strike decreased;weight shift ability decreased  -     Comment (Gait/Stairs)  Pt ambulated with mod A x 2 with RUE support. She is unsteady requiring mod A x 2 with 2 instances of LOB requiring mod A x 2 to recover. Cues for upright posture and sequencing needed. Pulled up  chair behind pt as she became increasingly unsteady.  -     Row Name 04/12/21 0905          Mobility    Extremity Weight-bearing Status  left upper extremity  -     Left Upper Extremity (Weight-bearing Status)  weight-bearing as tolerated (WBAT)  -       User Key  (r) = Recorded By, (t) = Taken By, (c) = Cosigned By    Initials Name Provider Type    Anthony Morgan, PT Physical Therapist        Obj/Interventions     [facility] Name 04/12/21 0908          Motor Skills    Therapeutic Exercise  knee;ankle  -JH     Row Name 04/12/21 0908          Knee (Therapeutic Exercise)    Knee (Therapeutic Exercise)  strengthening exercise  -     Knee Strengthening (Therapeutic Exercise)  bilateral;SLR (straight leg raise);LAQ (long arc quad);10 repetitions  -JH     Row Name 04/12/21 0908          Ankle (Therapeutic Exercise)    Ankle (Therapeutic Exercise)  AROM (active range of motion)  -     Ankle AROM (Therapeutic Exercise)  bilateral;dorsiflexion;plantarflexion;10 repetitions  -JH     Row Name 04/12/21 0908          Balance    Balance Assessment  sitting static balance;sitting dynamic balance;standing static balance;standing dynamic balance  -     Static Sitting Balance  moderate impairment;supported;sitting, edge of bed  -     Dynamic Sitting Balance  moderate impairment;supported;sitting, edge of bed  -     Static Standing Balance  moderate impairment;supported  -     Dynamic Standing Balance  severe impairment;supported  -     Balance Interventions  sitting;standing;sit to stand;supported;static;dynamic;minimal challenge  -     Comment, Balance  2 instances of LOB needing mod A x 2 to recover  -       User Key  (r) = Recorded By, (t) = Taken By, (c) = Cosigned By    Initials Name Provider Type    Anthony Morgan, AVERY Physical Therapist        Goals/Plan    No documentation.       Clinical Impression     [facility] Name 04/12/21 0909          Pain    Additional Documentation  Pain Scale: Numbers  Pre/Post-Treatment (Group)  -[facility] Name 04/12/21 0909          Pain Scale: Numbers Pre/Post-Treatment    Pretreatment Pain Rating  0/10 - no pain  -     Posttreatment Pain Rating  0/10 - no pain  -     Pain Intervention(s)  Repositioned;Ambulation/increased activity  -[facility] Name 04/12/21 0909          Plan of Care Review    Plan of Care Reviewed With  patient  -     Progress  improving  -     Outcome Summary  Pt required mod A for sup > sit and mod A x 2 to ambulate 20 feet in room with UE support. Pt is unsteady on her feet and had 2 LOB requiring mod A x 2 to recover needing chair pulled up behind pt to sit. Continue SNF d/c recs.  -[facility] Name 04/12/21 0909          Therapy Assessment/Plan (PT)    Rehab Potential (PT)  good, to achieve stated therapy goals  [facility]     Criteria for Skilled Interventions Met (PT)  yes  -[facility] Name 04/12/21 0909          Vital Signs    Pre Systolic BP Rehab  162  -     Pre Treatment Diastolic BP  84  -     Pretreatment Heart Rate (beats/min)  86  -     Posttreatment Heart Rate (beats/min)  97  -     Pre SpO2 (%)  93  -     O2 Delivery Pre Treatment  room air  -     O2 Delivery Intra Treatment  room air  -     Post SpO2 (%)  91  -     O2 Delivery Post Treatment  room air  -     Pre Patient Position  Supine  -     Intra Patient Position  Standing  -     Post Patient Position  Sitting  -[facility] Name 04/12/21 0909          Positioning and Restraints    Pre-Treatment Position  in bed  -     Post Treatment Position  chair  -     In Chair  reclined;call light within reach;encouraged to call for assist;exit alarm on;legs elevated  -       User Key  (r) = Recorded By, (t) = Taken By, (c) = Cosigned By    Initials Name Provider Type     Anthony Servin, PT Physical Therapist        Outcome Measures     [facility] Name 04/12/21 0912          How much help from another person do you currently need...    Turning from your back to your side while  in flat bed without using bedrails?  2  -JH     Moving from lying on back to sitting on the side of a flat bed without bedrails?  2  -JH     Moving to and from a bed to a chair (including a wheelchair)?  2  -JH     Standing up from a chair using your arms (e.g., wheelchair, bedside chair)?  2  -JH     Climbing 3-5 steps with a railing?  1  -JH     To walk in hospital room?  2  -     AM-PAC 6 Clicks Score (PT)  11  -     Row Name 04/12/21 0912          Functional Assessment    Outcome Measure Options  AM-PAC 6 Clicks Basic Mobility (PT)  -       User Key  (r) = Recorded By, (t) = Taken By, (c) = Cosigned By    Initials Name Provider Type    Anthony Morgan PT Physical Therapist        Physical Therapy Education                 Title: PT OT SLP Therapies (Done)     Topic: Physical Therapy (Done)     Point: Mobility training (Done)     Learning Progress Summary           Patient Acceptance, E,TB, VU by  at 4/12/2021 0913    Comment: edu to pt on safe sequencing with transfers, gait, and HEP    Acceptance, E, VU,NR by  at 4/11/2021 1356    Comment: Patient education regarding sequening for bed mobility and transfers.                   Point: Home exercise program (Done)     Learning Progress Summary           Patient Acceptance, E,TB, VU by  at 4/12/2021 0913    Comment: edu to pt on safe sequencing with transfers, gait, and HEP    Acceptance, E, VU,NR by  at 4/11/2021 1356    Comment: Patient education regarding sequening for bed mobility and transfers.                   Point: Body mechanics (Done)     Learning Progress Summary           Patient Acceptance, E,TB, VU by  at 4/12/2021 0913    Comment: edu to pt on safe sequencing with transfers, gait, and HEP    Acceptance, E, VU,NR by  at 4/11/2021 1356    Comment: Patient education regarding sequening for bed mobility and transfers.                   Point: Precautions (Done)     Learning Progress Summary           Patient Acceptance, E,TB, VU by   at 4/12/2021 0913    Comment: edu to pt on safe sequencing with transfers, gait, and HEP    Acceptance, E, VU,NR by  at 4/11/2021 2276    Comment: Patient education regarding sequening for bed mobility and transfers.                               User Key     Initials Effective Dates Name Provider Type Discipline     03/11/20 -  Sera Kaba, PT Physical Therapist PT     09/22/20 -  Anthony Servin PT Physical Therapist PT              PT Recommendation and Plan     Plan of Care Reviewed With: patient  Progress: improving  Outcome Summary: Pt required mod A for sup > sit and mod A x 2 to ambulate 20 feet in room with UE support. Pt is unsteady on her feet and had 2 LOB requiring mod A x 2 to recover needing chair pulled up behind pt to sit. Continue SNF d/c recs.     Time Calculation:   PT Charges     Row Name 04/12/21 0914             Time Calculation    Start Time  0838  -      PT Received On  04/12/21  -      PT Goal Re-Cert Due Date  04/21/21  -         Time Calculation- PT    Total Timed Code Minutes- PT  24 minute(s)  -         Timed Charges    35885 - PT Therapeutic Exercise Minutes  12  -      48671 - PT Therapeutic Activity Minutes  12  -        User Key  (r) = Recorded By, (t) = Taken By, (c) = Cosigned By    Initials Name Provider Type     Anthony Servin, AVERY Physical Therapist        Therapy Charges for Today     Code Description Service Date Service Provider Modifiers Qty    30184421140 HC PT THER PROC EA 15 MIN 4/12/2021 Anthony Servin, PT GP 1    34721498021 HC PT THERAPEUTIC ACT EA 15 MIN 4/12/2021 Anthony Servin, PT GP 1    18322537553 HC PT THER SUPP EA 15 MIN 4/12/2021 Anthony Servin, PT GP 2          PT G-Codes  Outcome Measure Options: AM-PAC 6 Clicks Basic Mobility (PT)  AM-PAC 6 Clicks Score (PT): 11  AM-PAC 6 Clicks Score (OT): 12    Anthony Servin PT  4/12/2021

## 2021-04-12 NOTE — PROGRESS NOTES
Orthopedic Daily Progress Note      CC: No acute events overnight.  Patient did receive 1 unit of packed red blood cells yesterday.    Pain fairly well controlled  General: no fevers, chills  Abdomen: No nausea, vomiting, or diarrhea    No other complaints    Physical Exam:  I have reviewed the vital signs.  Temp:  [97 °F (36.1 °C)-98.2 °F (36.8 °C)] 97.6 °F (36.4 °C)  Heart Rate:  [] 80  Resp:  [16-18] 18  BP: (125-194)/(50-82) 194/69    Objective  General Appearance:    Alert, cooperative, no distress  Extremities: No clubbing, cyanosis, or edema to lower extremities  Pulses:  2+ in distal surgical extremity  Skin: Incision Clean/dry/intact      Results Review:    I have reviewed the labs, radiology results and diagnostic studies: Posttransfusion hemoglobin on 4/11/2021 9.5    Results from last 7 days   Lab Units 04/11/21  1553 04/11/21  0644   WBC 10*3/mm3  --  10.18   HEMOGLOBIN g/dL 9.5* 7.4*   PLATELETS 10*3/mm3  --  411     Results from last 7 days   Lab Units 04/11/21  0644   SODIUM mmol/L 138   POTASSIUM mmol/L 4.9   CO2 mmol/L 27.0   CREATININE mg/dL 0.87   GLUCOSE mg/dL 110*       I have reviewed the medications.    Assessment/Problem List  POD# 2 Days Post-Op   S/p intramedullary nail left proximal humerus fracture  Postop, blood loss anemia: Improved after transfusion    Plan  Weightbearing as tolerated and range of motion as tolerated left upper extremity  PT and OT  Continue care per primary team.      Eddie Matos Jr., MD  04/12/21  08:29 EDT

## 2021-04-12 NOTE — PLAN OF CARE
Patient alert and oriented x 4. Prn tylenol given for pain. Stef to [facility] c/d/I. Denies n/t. Dulcolax suppository give, last bm 4/08. Anticipating d/c to ipr tomorrow.

## 2021-04-12 NOTE — PROGRESS NOTES
"                  Clinical Nutrition     Reason for Visit:   MST score 2+    Patient Name: Andra Buchanan  YOB: 1931  MRN: [id number]  Date of Encounter: 04/12/21 11:24 EDT  Admission date: 4/9/2021    Nutrition Assessment   Assessment     Admission diagnosis  L shoulder fracture s/p fall    Additional diagnosis/conditions/procedures this admission  (4/10) s/p L humerus proximal ORIF  Leukocytosis, resolved  Postop blood loss anemia  L eye conjunctivitis   Sinus tachycardia    Additional PMH/procedures:  HTN  Breast cancer  Frequent falls  Arthritis    R mastectomy  R hip fracture repair  CCY      Reported/Observed/Food/Nutrition Related History:     4/12) Patient and family in room at visit. Both report weight loss over the past 2 years. Patient was weighing around 110 lbs prior to admission to [facility] 2 years ago. Report decreased appetite/intake for greater than 1 month prior to admission. Patient believes she has lost weigh in the past month. Appetite has not been good, does not have much desire to eat. Her typical intake on a good day is x2 meals (breakfast and dinner). She reports she has never been much of a grazer/snacker. She states recently she has been eating just a few bites of her usual meals and that's it. She has been drinking ~2 Boost Breeze supplements daily. She does not care for milk products. Would like to receive Boost Breeze supplements while here. Does not have a flavor preference. Menu provided and encouraged alternate selections with catering staff.      Anthropometrics     Height: 170.2 cm (67\")  Last filed wt: Weight: 47.2 kg (104 lb) (04/09/21 1238)  Weight Method: Stated    BMI: BMI (Calculated): 16.3  Underweight:<18.5kg/m2    Ideal Body Weight (IBW) (kg): 61.86  Admission wt: 104 lb  Method obtained: weight per charting 4/9 no method listed    Weight Weight (kg) Weight (lbs) Weight Method   6/2/2020 48.081 kg 106 lb    11/22/2020 48.988 kg 108 lb Stated "   3/11/2021 47.174 kg 104 lb Stated   3/18/2021 44.453 kg 98 lb    4/9/2021   Stated   4/9/2021 47.174 kg 104 lb        Vitals - Per Care Everywhere [facility]    10/27/2020 03:00PM MEDICARE WELLNESS VISIT  Height Weight BMI Blood Pressure   67 in 104 lbs 16.3 kg/m2  130/80 mm[Hg]     03/03/2020 02:30PM RECHECK  Height Weight BMI Blood Pressure   67 in 104 lbs 16.3 kg/m2  120/60 mm[Hg]     Weight Change   UBW: 104 lbs  Weight change: ~7 lb weight loss per family reported weight at home on standing scale   % wt change: 6.7%  Time frame of weight loss: ~7 months     Labs reviewed     Results from last 7 days   Lab Units 04/11/21  0644 04/10/21  0623 04/09/21  1359   GLUCOSE mg/dL 110* 104* 141*   BUN mg/dL 16 17 16   CREATININE mg/dL 0.87 0.82 0.98   SODIUM mmol/L 138 139 139   CHLORIDE mmol/L 105 104 101   POTASSIUM mmol/L 4.9 4.5 3.4*   ALT (SGPT) U/L  --   --  9     Results from last 7 days   Lab Units 04/09/21  1359   ALBUMIN g/dL 3.40*           No results found for: HGBA1C      Medications reviewed   Pertinent: coreg  GTT: naropin IV      Intake/Output 24 hrs (7:00AM - 6:59 AM)     Intake & Output (last day)       04/11 0701 - 04/12 0700 04/12 0701 - 04/13 0700    P.O. 480     I.V. (mL/kg)      Blood 360     Total Intake(mL/kg) 840 (17.8)     Urine (mL/kg/hr) 1350 (1.2) 400 (1.9)    Blood      Total Output 1350 400    Net -510 -400          Urine Unmeasured Occurrence 1 x           Current Nutrition Prescription     PO: Diet Regular  Intake: 31% x 4 meals    Nutrition Diagnosis     4/12  Problem Inadequate oral intake   Etiology Decreased appetite   Signs/Symptoms PO Intake (31% x 4 meals)       Nutrition Intervention     1.  Follow treatment progress, Care plan reviewed  2.  Advise alternate selection, Menu provided   3. Supplements ordered - Boost Breeze x3/day  4. Encouraged oral/supplement intake  5. Monitor weight trends    Goal:   General: Nutrition support treatment  PO: Increase  intake    Monitoring/Evaluation:   Per protocol, PO intake, Pertinent labs, Weight, Symptoms    Audelia Anderson, ОЛЬГАN, LD  Time Spent: 30 minutes

## 2021-04-12 NOTE — PROGRESS NOTES
[facility]    Acute pain service Inpatient Progress Note    Patient Name: Andra Buchanan  :  1931  MRN:  [id number]        Acute Pain  Service Inpatient Progress Note:    Analgesia:Good  Pain Score:3/10  LOC: alert and awake  Resp Status: room air  Cardiac: VS stable  Side Effects:None  Catheter Site:clean, dressing intact and dry  Cath type: peripheral nerve cath with ON Q  Infusion rate: 6ml/hr  Catheter Plan:Catheter to remain Insitu and Continue catheter infusion rate unchanged  Comments: Patient doing well

## 2021-04-12 NOTE — PLAN OF CARE
Goal Outcome Evaluation:  Plan of Care Reviewed With: patient     Outcome Summary: Pt declined OOB and EOB activity. She completed LUE AAROM and tolerated shoulder , IR chest/ER 20. Recommend IPR.

## 2021-04-12 NOTE — PROGRESS NOTES
Case Management Discharge Note      Final Note: Per Sharon at [facility], they have a skilled bed and can accept Ms Chanel barkerorrow 4/13. She and her daughter in agreement with plan. [facility] scheduled for transport at [facility], she will need a neg Covid. Call report to [phone]         Selected Continued Care - Admitted Since 4/9/2021     Destination    No services have been selected for the patient.              Durable Medical Equipment    No services have been selected for the patient.              Dialysis/Infusion    No services have been selected for the patient.              Home Medical Care    No services have been selected for the patient.              Therapy    No services have been selected for the patient.              Community Resources    No services have been selected for the patient.                       Final Discharge Disposition Code: 03 - skilled nursing facility (SNF)

## 2021-04-12 NOTE — PLAN OF CARE
Goal Outcome Evaluation:  Plan of Care Reviewed With: patient  Progress: improving  Outcome Summary: Pt required mod A for sup > sit and mod A x 2 to ambulate 20 feet in room with UE support. Pt is unsteady on her feet and had 2 LOB requiring mod A x 2 to recover needing chair pulled up behind pt to sit. Continue SNF d/c recs.

## 2021-04-12 NOTE — PROGRESS NOTES
Continued Stay Note  [facility]     Patient Name: Andra Buchanan  MRN: [id number]  Today's Date: 4/12/2021    Admit Date: 4/9/2021    Discharge Plan     Row Name 04/12/21 1248       Plan    Plan  SNF    Plan Comments  I spoke with Ms Buchanan and daughter at bedside. We discussed d/c options. They are interested in short term rehab. They have requested a referral to [facility] for short term skilled rehab. I spoke with Sharon who accepted referral.Case mgt will follow and assist with final plan        Discharge Codes    No documentation.       Expected Discharge Date and Time     Expected Discharge Date Expected Discharge Time    Apr 14, 2021             Sonja C Kellerman, RN

## 2021-04-12 NOTE — PLAN OF CARE
"Pt c/o pain in left shoulder but refused pain medicine because she said she \"does not do well on medicine.\" Ice was placed on the shoulder instead. Blood pressure went up to 188/80 manual (automatic cuff would not read) and an order for BP med was requested and given (2.5 mg IV metoprolol). Pt said she might need a suppository today if she does not have a bowel movement.        "

## 2021-04-12 NOTE — PROGRESS NOTES
[facility] Medicine Services  PROGRESS NOTE    Patient Name: Andra Buchanan  : 1931  MRN: [id number]    Date of Admission: 2021  Primary Care Physician: Provider, No Known    Subjective   Subjective     CC:  F/U L proximal humerus fracture     HPI:  Pt seen and examined. RN notes reviewed. No acute events overnight. Pt requests suppository. Having some pain but doesn't want to take pain meds    ROS:  Gen- No fevers, chills  CV- No chest pain, palpitations  Resp- No cough, dyspnea  GI- No N/V/D, abd pain    Objective   Objective     Vital Signs:   Temp:  [97 °F (36.1 °C)-98.2 °F (36.8 °C)] 97.6 °F (36.4 °C)  Heart Rate:  [80-97] 84  Resp:  [16-18] 16  BP: (127-194)/(56-86) 132/58        Physical Exam:  Constitutional: No acute distress, awake, alert  HENT: NCAT, mucous membranes moist, L eye conjunctivitis improved  Respiratory: Clear to auscultation bilaterally, respiratory effort normal   Cardiovascular: RRR, no murmurs, rubs, or gallops  Gastrointestinal: Positive bowel sounds, soft, nontender, nondistended  Musculoskeletal: No bilateral ankle edema, L arm in sling, able to wiggle fingers. Capillary refill <2 seconds   Psychiatric: Appropriate affect, cooperative  Neurologic: Oriented x 3, no focal deficits   Skin: No rashes    Results Reviewed:  Results from last 7 days   Lab Units 21  1553 21  0644 04/10/21  0621  1359   WBC 10*3/mm3  --  10.18 7.35 26.50*   HEMOGLOBIN g/dL 9.5* 7.4* 8.4* 9.5*   HEMATOCRIT % 29.9* 24.2* 28.0* 30.1*   PLATELETS 10*3/mm3  --  411 440 554*     Results from last 7 days   Lab Units 21  0644 04/10/21  0621  1359   SODIUM mmol/L 138 139 139   POTASSIUM mmol/L 4.9 4.5 3.4*   CHLORIDE mmol/L 105 104 101   CO2 mmol/L 27.0 29.0 27.0   BUN mg/dL 16 17 16   CREATININE mg/dL 0.87 0.82 0.98   GLUCOSE mg/dL 110* 104* 141*   CALCIUM mg/dL 9.0 8.5* 9.3   ALT (SGPT) U/L  --   --  9   AST (SGOT) U/L  --   --  17   TROPONIN  T ng/mL  --   --  <0.010     Estimated Creatinine Clearance: 32.7 mL/min (by C-G formula based on SCr of 0.87 mg/dL).    Microbiology Results Abnormal     Procedure Component Value - Date/Time    COVID PRE-OP / PRE-PROCEDURE SCREENING ORDER (NO ISOLATION) - Swab, Nasopharynx [[id number]]  (Normal) Collected: 04/09/21 1358    Lab Status: Final result Specimen: Swab from Nasopharynx Updated: 04/09/21 1458    Narrative:      The following orders were created for panel order COVID PRE-OP / PRE-PROCEDURE SCREENING ORDER (NO ISOLATION) - Swab, Nasopharynx.  Procedure                               Abnormality         Status                     ---------                               -----------         ------                     COVID-19 and FLU A/B PCR...[[id number]]  Normal              Final result                 Please view results for these tests on the individual orders.    COVID-19 and FLU A/B PCR - Swab, Nasopharynx [[id number]]  (Normal) Collected: 04/09/21 1358    Lab Status: Final result Specimen: Swab from Nasopharynx Updated: 04/09/21 1458     COVID19 Not Detected     Influenza A PCR Not Detected     Influenza B PCR Not Detected    Narrative:      Fact sheet for providers: https://www.fda.gov/media/175998/download    Fact sheet for patients: https://www.fda.gov/media/581643/download    Test performed by PCR.          Imaging Results (Last 24 Hours)     ** No results found for the last 24 hours. **              I have reviewed the medications:  Scheduled Meds:amLODIPine, 5 mg, Oral, Q24H  carvedilol, 6.25 mg, Oral, BID With Meals  ciprofloxacin, 2 drop, Left Eye, Q4H While Awake  heparin (porcine), 5,000 Units, Subcutaneous, Q8H  sodium chloride, 10 mL, Intravenous, Q12H      Continuous Infusions:lactated ringers, 9 mL/hr, Last Rate: Stopped (04/10/21 1031)  ropivacaine (NAROPIN) 0.2% peripheral nerve cath (moog), 6 mL/hr, Last Rate: 6 mL/hr (04/12/21 9093)      PRN Meds:.•  acetaminophen  •  bisacodyl  •   HYDROcodone-acetaminophen  •  HYDROmorphone  •  [DISCONTINUED] Morphine **AND** naloxone  •  ondansetron **OR** ondansetron  •  sodium chloride    Assessment/Plan   Assessment & Plan     Active Hospital Problems    Diagnosis  POA   • **Closed displaced fracture of surgical neck of left humerus [S42.212A]  Yes   • Anemia [D64.9]  Yes   • Leukocytosis [D72.829]  Yes   • Acute conjunctivitis of left eye [H10.32]  Yes      Resolved Hospital Problems   No resolved problems to display.        Brief Hospital Course to date:  Andra Buchanan is a 89 y.o. female presents after a fall while using her walker and was found to have a left humeral neck fracture.    This patient's problems and plans were partially entered by my partner and updated as appropriate by me 04/12/21.     Left humeral neck fracture  -S/P intramedullary nail left proximal humerus with Dr Matos 4/10  -Pain Control  -WBAT and ROM as tolerated LUE   -PT/OT recommending rehab, patient agreeable. CM to see for referrals.      Leukocytosis  -Resolved, likely reactive   -UA and CXR negative      Acute on Chronic Anemia/Post op blood loss anemia  -H&H improved after 1 unit PRBCs     Left eye conjunctivitis  -Continue Cipro gtt     Sinus Tachycardia  HTN  -Resolved  -Continue Coreg, increased dose to 6.25mg BID  -Added Norvasc this AM but made pt nauseous and she says it has done this in the past. Will DC and change to Losartan    Constipation  -Suppository added     DVT Prophylaxis:  KONSTANTIN    Disposition: I expect the patient to be discharged when accepted to rehab    CODE STATUS:   Code Status and Medical Interventions:   Ordered at: 04/09/21 1529     Limited Support to NOT Include:    Intubation    Cardioversion/Defibrillation     Level Of Support Discussed With:    Patient     Code Status:    No CPR     Medical Interventions (Level of Support Prior to Arrest):    Limited       Dolores Feldman DO  04/12/21

## 2021-04-12 NOTE — THERAPY TREATMENT NOTE
Patient Name: Andra Buchanan  : 1931    MRN: [id number]                              Today's Date: 2021       Admit Date: 2021    Visit Dx:     ICD-10-CM ICD-9-CM   1. Closed displaced fracture of surgical neck of left humerus, unspecified fracture morphology, initial encounter  S42.212A 812.01   2. Leukocytosis, unspecified type  D72.829 288.60     Patient Active Problem List   Diagnosis   • Closed displaced fracture of surgical neck of left humerus   • Anemia   • Leukocytosis   • Acute conjunctivitis of left eye     Past Medical History:   Diagnosis Date   • Arthritis    • Breast cancer (CMS/HCC)    • Hypertension      Past Surgical History:   Procedure Laterality Date   • CHOLECYSTECTOMY     • DILATATION AND CURETTAGE     • FRACTURE SURGERY      Rt hip   • HYSTERECTOMY     • JOINT REPLACEMENT     • MASTECTOMY Right     2008   • MASTECTOMY       General Information     Row Name 21          OT Time and Intention    Document Type  therapy note (daily note)  -AR     Mode of Treatment  individual therapy;occupational therapy  -AR     Row Name 21          General Information    Existing Precautions/Restrictions  fall;other (see comments) interscalene nerve catheter  -AR     Row Name 21          Cognition    Orientation Status (Cognition)  oriented x 4  -AR     Row Name 21          Safety Issues, Functional Mobility    Safety Issues Affecting Function (Mobility)  safety precaution awareness;safety precautions follow-through/compliance  -AR     Impairments Affecting Function (Mobility)  range of motion (ROM);pain;strength;endurance/activity tolerance  -AR       User Key  (r) = Recorded By, (t) = Taken By, (c) = Cosigned By    Initials Name Provider Type    Mini Coker OT Occupational Therapist          Mobility/ADL's     Row Name 21          Bed Mobility    Comment (Bed Mobility)  pt declined  -AR     Row Name 21           Transfers    Comment (Transfers)  pt declined  -AR     Row Name 04/12/21 1834          Mobility    Extremity Weight-bearing Status  left upper extremity  -AR     Left Upper Extremity (Weight-bearing Status)  weight-bearing as tolerated (WBAT)  -AR       User Key  (r) = Recorded By, (t) = Taken By, (c) = Cosigned By    Initials Name Provider Type    Mini Coker OT Occupational Therapist        Obj/Interventions     Row Name 04/12/21 1834          Shoulder (Therapeutic Exercise)    Shoulder AAROM (Therapeutic Exercise)  left;flexion;extension;external rotation;internal rotation;horizontal aBduction/aDduction;supine;10 repetitions  -AR     Row Name 04/12/21 1834          Elbow/Forearm (Therapeutic Exercise)    Elbow/Forearm (Therapeutic Exercise)  AAROM (active assistive range of motion)  -AR     Elbow/Forearm AAROM (Therapeutic Exercise)  left;flexion;extension;supination;pronation;10 repetitions;supine  -AR     Row Name 04/12/21 1834          Wrist (Therapeutic Exercise)    Wrist (Therapeutic Exercise)  AROM (active range of motion)  -AR     Wrist AROM (Therapeutic Exercise)  left;flexion;extension;10 repetitions  -AR     [facility] Name 04/12/21 1834          Hand (Therapeutic Exercise)    Hand (Therapeutic Exercise)  AROM (active range of motion)  -AR     Hand AROM/AAROM (Therapeutic Exercise)  left;finger flexion;finger extension;AROM (active range of motion);10 repetitions  -AR     Row Name 04/12/21 1834          Therapeutic Exercise    Therapeutic Exercise  shoulder;elbow/forearm;wrist;hand  -AR       User Key  (r) = Recorded By, (t) = Taken By, (c) = Cosigned By    Initials Name Provider Type    Mini Coker OT Occupational Therapist        Goals/Plan    No documentation.       Clinical Impression     [facility] Name 04/12/21 1834          Pain Scale: Numbers Pre/Post-Treatment    Pretreatment Pain Rating  0/10 - no pain  -AR     Posttreatment Pain Rating  0/10 - no pain  -AR     Row Name 04/12/21 1834           Plan of Care Review    Plan of Care Reviewed With  patient  -AR     Outcome Summary  Pt declined OOB and EOB activity. She completed LUE AAROM and tolerated shoulder , IR chest/ER 20. Recommend IPR.  -AR     Row Name 04/12/21 1834          Therapy Plan Review/Discharge Plan (OT)    Anticipated Discharge Disposition (OT)  inpatient rehabilitation facility  -AR     Row Name 04/12/21 1834          Vital Signs    Pre Patient Position  Supine  -AR     Intra Patient Position  Supine  -AR     Post Patient Position  Supine  -AR     Row Name 04/12/21 1834          Positioning and Restraints    Pre-Treatment Position  in bed  -AR     Post Treatment Position  bed  -AR       User Key  (r) = Recorded By, (t) = Taken By, (c) = Cosigned By    Initials Name Provider Type    Mini Coker OT Occupational Therapist        Outcome Measures     Row Name 04/12/21 1823          How much help from another is currently needed...    Putting on and taking off regular lower body clothing?  2  -AR     Bathing (including washing, rinsing, and drying)  2  -AR     Toileting (which includes using toilet bed pan or urinal)  2  -AR     Putting on and taking off regular upper body clothing  2  -AR     Taking care of personal grooming (such as brushing teeth)  3  -AR     Eating meals  3  -AR     AM-PAC 6 Clicks Score (OT)  14  -AR     Row Name 04/12/21 1823          Functional Assessment    Outcome Measure Options  AM-PAC 6 Clicks Daily Activity (OT)  -AR       User Key  (r) = Recorded By, (t) = Taken By, (c) = Cosigned By    Initials Name Provider Type    Mini Coker OT Occupational Therapist        Occupational Therapy Education                 Title: PT OT SLP Therapies (Done)     Topic: Occupational Therapy (Done)     Point: ADL training (Done)     Description:   Instruct learner(s) on proper safety adaptation and remediation techniques during self care or transfers.   Instruct in proper use of assistive  devices.              Learning Progress Summary           Patient Acceptance, E,D,TB,H, VU,NR by HK at 4/11/2021 1025   Family Acceptance, E,D,TB,H, VU,NR by HK at 4/11/2021 1025                   Point: Home exercise program (Done)     Description:   Instruct learner(s) on appropriate technique for monitoring, assisting and/or progressing therapeutic exercises/activities.              Learning Progress Summary           Patient Eager, E,TB,D, VU,NR by AR at 4/12/2021 1856    Acceptance, E,D,TB,H, VU,NR by HK at 4/11/2021 1025   Family Acceptance, E,D,TB,H, VU,NR by HK at 4/11/2021 1025                   Point: Precautions (Done)     Description:   Instruct learner(s) on prescribed precautions during self-care and functional transfers.              Learning Progress Summary           Patient Acceptance, E,D,TB,H, VU,NR by HK at 4/11/2021 1025   Family Acceptance, E,D,TB,H, VU,NR by  at 4/11/2021 1025                   Point: Body mechanics (Done)     Description:   Instruct learner(s) on proper positioning and spine alignment during self-care, functional mobility activities and/or exercises.              Learning Progress Summary           Patient Acceptance, E,D,TB,H, VU,NR by HK at 4/11/2021 1025   Family Acceptance, E,D,TB,H, VU,NR by  at 4/11/2021 1025                               User Key     Initials Effective Dates Name Provider Type Discipline    AR 06/22/15 -  Mini Medina, OT Occupational Therapist OT     03/07/18 -  Cassandra Escobar OT Occupational Therapist OT              OT Recommendation and Plan     Plan of Care Review  Plan of Care Reviewed With: patient  Outcome Summary: Pt declined OOB and EOB activity. She completed LUE AAROM and tolerated shoulder , IR chest/ER 20. Recommend IPR.     Time Calculation:   Time Calculation- OT     Row Name 04/12/21 1834             Time Calculation- OT    OT Start Time  1834  -AR      OT Received On  04/12/21  -AR      OT Goal Re-Cert Due Date   04/21/21  -AR         Timed Charges    98564 - OT Therapeutic Exercise Minutes  17  -AR        User Key  (r) = Recorded By, (t) = Taken By, (c) = Cosigned By    Initials Name Provider Type    Mini Coker OT Occupational Therapist        Therapy Charges for Today     Code Description Service Date Service Provider Modifiers Qty    [id number]  OT THER PROC EA 15 MIN 4/12/2021 Mini Medina OT GO 1               Mini Medina OT  4/12/2021

## 2021-04-13 VITALS
TEMPERATURE: 97.6 F | RESPIRATION RATE: 15 BRPM | OXYGEN SATURATION: 95 % | HEIGHT: 67 IN | BODY MASS INDEX: 16.32 KG/M2 | DIASTOLIC BLOOD PRESSURE: 53 MMHG | WEIGHT: 104 LBS | HEART RATE: 70 BPM | SYSTOLIC BLOOD PRESSURE: 125 MMHG

## 2021-04-13 PROBLEM — D72.829 LEUKOCYTOSIS: Status: RESOLVED | Noted: 2021-04-09 | Resolved: 2021-04-13

## 2021-04-13 PROCEDURE — 99239 HOSP IP/OBS DSCHRG MGMT >30: CPT | Performed by: FAMILY MEDICINE

## 2021-04-13 PROCEDURE — 25010000002 HEPARIN (PORCINE) PER 1000 UNITS: Performed by: ORTHOPAEDIC SURGERY

## 2021-04-13 RX ORDER — ACETAMINOPHEN 325 MG/1
650 TABLET ORAL EVERY 4 HOURS PRN
Qty: 120 TABLET | Refills: 0
Start: 2021-04-13

## 2021-04-13 RX ORDER — LOSARTAN POTASSIUM 25 MG/1
25 TABLET ORAL
Qty: 30 TABLET | Refills: 0
Start: 2021-04-14 | End: 2021-05-05 | Stop reason: HOSPADM

## 2021-04-13 RX ORDER — ONDANSETRON 4 MG/1
4 TABLET, FILM COATED ORAL EVERY 6 HOURS PRN
Qty: 15 TABLET | Refills: 0
Start: 2021-04-13

## 2021-04-13 RX ORDER — CIPROFLOXACIN HYDROCHLORIDE 3.5 MG/ML
2 SOLUTION/ DROPS TOPICAL
Qty: 5 ML | Refills: 0
Start: 2021-04-13 | End: 2021-04-15

## 2021-04-13 RX ORDER — DOCUSATE SODIUM 100 MG/1
100 CAPSULE, LIQUID FILLED ORAL 2 TIMES DAILY
Qty: 60 CAPSULE | Refills: 0
Start: 2021-04-13

## 2021-04-13 RX ORDER — HYDROCODONE BITARTRATE AND ACETAMINOPHEN 5; 325 MG/1; MG/1
1 TABLET ORAL EVERY 6 HOURS PRN
Qty: 12 TABLET | Refills: 0 | Status: SHIPPED | OUTPATIENT
Start: 2021-04-13 | End: 2021-04-16

## 2021-04-13 RX ORDER — CARVEDILOL 6.25 MG/1
6.25 TABLET ORAL 2 TIMES DAILY WITH MEALS
Qty: 60 TABLET | Refills: 0
Start: 2021-04-13 | End: 2021-05-05 | Stop reason: HOSPADM

## 2021-04-13 RX ADMIN — LOSARTAN POTASSIUM 25 MG: 25 TABLET, FILM COATED ORAL at 08:53

## 2021-04-13 RX ADMIN — CIPROFLOXACIN 2 DROP: 3 SOLUTION OPHTHALMIC at 10:19

## 2021-04-13 RX ADMIN — HEPARIN SODIUM 5000 UNITS: 5000 INJECTION INTRAVENOUS; SUBCUTANEOUS at 05:29

## 2021-04-13 RX ADMIN — SODIUM CHLORIDE, PRESERVATIVE FREE 10 ML: 5 INJECTION INTRAVENOUS at 02:40

## 2021-04-13 RX ADMIN — CIPROFLOXACIN 2 DROP: 3 SOLUTION OPHTHALMIC at 05:29

## 2021-04-13 RX ADMIN — CARVEDILOL 6.25 MG: 6.25 TABLET, FILM COATED ORAL at 08:53

## 2021-04-13 NOTE — PROGRESS NOTES
[facility]    Acute pain service Inpatient Progress Note    Patient Name: Andra Buchanan  :  1931  MRN:  [id number]        Acute Pain  Service Inpatient Progress Note:    Analgesia:Excellent  Pain Score:/10  LOC: alert and awake  Cardiac: VS stable  Side Effects:None  Catheter Site:clean, dry and dressing intact  Cath type: peripheral nerve cath with ON Q  Infusion rate: 6ml/hr  Catheter Plan:RN to remove catheter

## 2021-04-13 NOTE — DISCHARGE SUMMARY
[facility] Medicine Services  DISCHARGE SUMMARY    Patient Name: Andra Buchanan  : 1931  MRN: [id number]    Date of Admission: 2021 12:34 PM  Date of Discharge:  2021  Primary Care Physician: Provider, No Known    Consults     Date and Time Order Name Status Description    2021  4:10 PM Inpatient Orthopedic Surgery Consult Completed           Hospital Course     Presenting Problem:   Closed displaced fracture of surgical neck of left humerus, unspecified fracture morphology, initial encounter [S42.212A]    Active Hospital Problems    Diagnosis  POA   • **Closed displaced fracture of surgical neck of left humerus [S42.212A]  Yes   • Anemia [D64.9]  Yes   • Acute conjunctivitis of left eye [H10.32]  Yes      Resolved Hospital Problems    Diagnosis Date Resolved POA   • Leukocytosis [D72.829] 2021 Yes          Hospital Course:  Andra Buchanan is a 89 y.o. female presents after a fall while using her walker and was found to have a left humeral neck fracture.     Left humeral neck fracture  -S/P intramedullary nail left proximal humerus with Dr Matos 4/10  -Pain Control PRN  -WBAT and ROM as tolerated LUE   -PT/OT recommending rehab, plan for transfer today      Leukocytosis  -Resolved, likely reactive   -UA and CXR negative      Acute on Chronic Anemia/Post op blood loss anemia  -H&H improved after 1 unit PRBCs     Left eye conjunctivitis  -Continue Cipro gtt for 2 more days      Sinus Tachycardia  HTN  -Resolved  -Continue Coreg, increased dose to 6.25mg BID  -Continue Losartan 25mg daily      Constipation  -Resolved after fleets enema  -Continue Colace BID     Discharge Follow Up Recommendations for outpatient labs/diagnostics:  -PCP 1 week  -Dr Matos in 2 weeks     Day of Discharge     HPI:   Pt seen and examined. RN notes reviewed. No acute events overnight. Pt doing well. States she's just uncomfortable. Eager to transfer to rehab today.     Review of  Systems  Gen- No fevers, chills  CV- No chest pain, palpitations  Resp- No cough, dyspnea  GI- No N/V/D, abd pain    Vital Signs:   Temp:  [97 °F (36.1 °C)-97.6 °F (36.4 °C)] 97 °F (36.1 °C)  Heart Rate:  [65-89] 73  Resp:  [15-18] 15  BP: (132-177)/(58-94) 153/67     Physical Exam:  Constitutional: No acute distress, awake, alert  HENT: NCAT, mucous membranes moist, conjunctivitis resolved   Respiratory: Clear to auscultation bilaterally, respiratory effort normal   Cardiovascular: RRR, no murmurs, rubs, or gallops  Gastrointestinal: Positive bowel sounds, soft, nontender, nondistended  Musculoskeletal: No bilateral ankle edema  Psychiatric: Appropriate affect, cooperative  Neurologic: Oriented x 3, strength symmetric in all extremities, Cranial Nerves grossly intact to confrontation, speech clear  Skin: No rashes    Pertinent  and/or Most Recent Results     LAB RESULTS:      Lab 04/11/21  1553 04/11/21  0644 04/10/21  0623 04/09/21  1359   WBC  --  10.18 7.35 26.50*   HEMOGLOBIN 9.5* 7.4* 8.4* 9.5*   HEMATOCRIT 29.9* 24.2* 28.0* 30.1*   PLATELETS  --  411 440 554*   NEUTROS ABS  --   --   --  23.86*   IMMATURE GRANS (ABS)  --   --   --  0.22*   LYMPHS ABS  --   --   --  0.93   MONOS ABS  --   --   --  1.42*   EOS ABS  --   --   --  0.00   MCV  --  92.4 94.3 91.5         Lab 04/11/21  0644 04/10/21  0623 04/09/21  1359   SODIUM 138 139 139   POTASSIUM 4.9 4.5 3.4*   CHLORIDE 105 104 101   CO2 27.0 29.0 27.0   ANION GAP 6.0 6.0 11.0   BUN 16 17 16   CREATININE 0.87 0.82 0.98   GLUCOSE 110* 104* 141*   CALCIUM 9.0 8.5* 9.3         Lab 04/09/21  1359   TOTAL PROTEIN 7.0   ALBUMIN 3.40*   GLOBULIN 3.6   ALT (SGPT) 9   AST (SGOT) 17   BILIRUBIN 0.3   ALK PHOS 135*         Lab 04/09/21  1359   TROPONIN T <0.010             Lab 04/09/21  1454 04/09/21  1408   ABO TYPING O O   RH TYPING Positive Positive   ANTIBODY SCREEN  --  Positive         Brief Urine Lab Results  (Last result in the past 365 days)      Color   Clarity    Blood   Leuk Est   Nitrite   Protein   CREAT   Urine HCG        04/09/21 1547 Yellow Clear Negative Trace Negative Trace             Microbiology Results (last 10 days)     Procedure Component Value - Date/Time    COVID PRE-OP / PRE-PROCEDURE SCREENING ORDER (NO ISOLATION) - Swab, Nasopharynx [[id number]]  (Normal) Collected: 04/12/21 1515    Lab Status: Final result Specimen: Swab from Nasopharynx Updated: 04/12/21 1608    Narrative:      The following orders were created for panel order COVID PRE-OP / PRE-PROCEDURE SCREENING ORDER (NO ISOLATION) - Swab, Nasopharynx.  Procedure                               Abnormality         Status                     ---------                               -----------         ------                     COVID-19,CEPHEID,FADUMO IN-...[[id number]]  Normal              Final result                 Please view results for these tests on the individual orders.    COVID-19,CEPHEID,FADUMO IN-HOUSE(OR EMERGENT/ADD-ON),NP SWAB IN TRANSPORT MEDIA 3-4 HR TAT - Swab, Nasopharynx [[id number]]  (Normal) Collected: 04/12/21 1515    Lab Status: Final result Specimen: Swab from Nasopharynx Updated: 04/12/21 1608     COVID19 Not Detected    Narrative:      Fact sheet for providers: https://www.fda.gov/media/715053/download     Fact sheet for patients: https://www.fda.gov/media/017181/download    COVID PRE-OP / PRE-PROCEDURE SCREENING ORDER (NO ISOLATION) - Swab, Nasopharynx [[id number]]  (Normal) Collected: 04/09/21 1358    Lab Status: Final result Specimen: Swab from Nasopharynx Updated: 04/09/21 1458    Narrative:      The following orders were created for panel order COVID PRE-OP / PRE-PROCEDURE SCREENING ORDER (NO ISOLATION) - Swab, Nasopharynx.  Procedure                               Abnormality         Status                     ---------                               -----------         ------                     COVID-19 and FLU A/B PCR...[[id number]]  Normal              Final result                  Please view results for these tests on the individual orders.    COVID-19 and FLU A/B PCR - Swab, Nasopharynx [[id number]]  (Normal) Collected: 04/09/21 1358    Lab Status: Final result Specimen: Swab from Nasopharynx Updated: 04/09/21 1456     COVID19 Not Detected     Influenza A PCR Not Detected     Influenza B PCR Not Detected    Narrative:      Fact sheet for providers: https://www.fda.gov/media/832294/download    Fact sheet for patients: https://www.fda.gov/media/044033/download    Test performed by PCR.          XR Shoulder 2+ View Left    Result Date: 4/9/2021  EXAMINATION: XR SHOULDER 2+ VW LEFT-  INDICATION: Injury  COMPARISON: NONE  FINDINGS: There is a completely displaced, comminuted and foreshortened fracture of the left humerus neck. The humeral head appears to remain well aligned within the glenoid. The acromioclavicular joint spaces demonstrate some mild degenerative change, otherwise unremarkable.      There is a completely displaced, comminuted and foreshortened fracture of the left humerus neck. The humeral head appears to remain well aligned within the glenoid. The acromioclavicular joint spaces demonstrate some mild degenerative change, otherwise unremarkable.  This report was finalized on 4/9/2021 1:14 PM by Rodrigo Hall.      XR Chest 1 View    Result Date: 4/9/2021  EXAMINATION: XR CHEST 1 VW-  INDICATION: fall/left shoulder fracture; S42.212A-Unspecified displaced fracture of surgical neck of left humerus, initial encounter for closed fracture  COMPARISON: NONE  FINDINGS: No focal airspace disease, effusion or pneumothorax. Unremarkable heart and mediastinal contours. Redemonstrated comminuted and displaced left proximal humerus fracture.      No evidence of acute disease in the chest.  This report was finalized on 4/9/2021 2:38 PM by Rodrigo Hall.      Left interscalene catheter    Result Date: 4/10/2021  Riki Huizar CRNA     4/10/2021  8:40 AM Left interscalene catheter  Patient reassessed immediately prior to procedure Patient location during procedure: pre-op Reason for block: at surgeon's request and post-op pain management Performed by CRNA: Riki Huizar CRNA Assisted by: Odessa Ramey MD Preanesthetic Checklist Completed: patient identified, IV checked, site marked, risks and benefits discussed, surgical consent, monitors and equipment checked, pre-op evaluation and timeout performed Prep: Pt Position: right lateral decubitus Sterile barriers:cap, gloves, mask and sterile barriers Prep: ChloraPrep Patient monitoring: blood pressure monitoring, continuous pulse oximetry and EKG Procedure Sedation:yes Performed under: local infiltration Guidance:ultrasound guided Images:still images obtained, printed/placed on chart Laterality:left Block Type:interscalene Injection Technique:catheter Needle Type:Tuohy and echogenic Needle Gauge:18 G Resistance on Injection: none Catheter Size:20 G (20g) Cath Depth at skin: 6 cm Medications Used: fentaNYL citrate (PF) (SUBLIMAZE) injection, 50 mcg bupivacaine PF (MARCAINE) 0.25 % injection, 15 mL Post Assessment Injection Assessment: negative aspiration for heme, no paresthesia on injection and incremental injection Patient Tolerance:comfortable throughout block Complications:no Additional Notes Procedure:               The pt was placed in semifowlers position with a slight tilt of the thorax contralateral to the insertion site.  The Insertion Site was prepped and draped in sterile fashion.  The pt was anesthetized with  IV Sedation( see meds) and  Skin and cutaneous tissue was infiltrated and anesthetized with 1% Lidocaine 3 mls via a 25g needle.  Utilizing ultrasound guidance, a BBraun 4 inch 18 g Contiplex echogenic touhy needle was advanced in-plane.  Hydro dissection of tissue was achieved with Normal saline. Major vessels(carotid and Internal Jugular) where visualized as the brachial plexus was approached at the approximate level of  C-7/ T-1.  Cervical 5 and Branches of Cervical 6 nerve roots where visualized and the needle tip was placed posterior at the level of C-6 roots.  LA spread was visualized and injection was made incrementally every 5 mls with aspiration. Injection pressure was normal or little, there was no intraneural injection, no vascular injection.    The BBraun 20 g wire stylet catheter was then placed under US guidance on the posterior aspect of the Brachial Plexus. The tuohy was removed and the location of catheter was confirmed with NS injection visualized with US . The skin was sealed with exofin tissue adhesive at catheter insertion site.  Skin was prepped with benzoin and the catheter was secured with steristrips and a CHG tegaderm. Appropriate labels applied. Thank You.     FL C Arm During Surgery    Result Date: 4/10/2021  EXAMINATION: FL C ARM DURING SURGERY-  INDICATION: left humerus fx; S42.212A-Unspecified displaced fracture of surgical neck of left humerus, initial encounter for closed fracture; D72.829-Elevated white blood cell count, unspecified  COMPARISON: NONE  FINDINGS: 2 minutes 57 seconds of fluoroscopy time provided with 4 images stored during left humerus ORIF      2 minutes 57 seconds of fluoroscopy time provided with 4 images stored during left humerus ORIF  This report was finalized on 4/10/2021 11:12 AM by Rodrigo Hall.                      Discharge Details        Discharge Medications      New Medications      Instructions Start Date   carvedilol 6.25 MG tablet  Commonly known as: COREG   6.25 mg, Oral, 2 Times Daily With Meals      ciprofloxacin 0.3 % ophthalmic solution  Commonly known as: CILOXAN   2 drops, Left Eye, Every 4 Hours While Awake      docusate sodium 100 MG capsule  Commonly known as: Colace   100 mg, Oral, 2 Times Daily      HYDROcodone-acetaminophen 5-325 MG per tablet  Commonly known as: NORCO   1 tablet, Oral, Every 6 Hours PRN      losartan 25 MG tablet  Commonly known as:  COZAAR   25 mg, Oral, Every 24 Hours Scheduled   Start Date: April 14, 2021     ondansetron 4 MG tablet  Commonly known as: ZOFRAN   4 mg, Oral, Every 6 Hours PRN         Changes to Medications      Instructions Start Date   acetaminophen 325 MG tablet  Commonly known as: TYLENOL  What changed:   · medication strength  · how much to take  · when to take this  · reasons to take this   650 mg, Oral, Every 4 Hours PRN         Continue These Medications      Instructions Start Date   AeroChamber MV inhaler   Use as instructed      albuterol sulfate  (90 Base) MCG/ACT inhaler  Commonly known as: PROVENTIL HFA;VENTOLIN HFA;PROAIR HFA   2 puffs, Inhalation, Every 4 Hours PRN             Allergies   Allergen Reactions   • Ibuprofen GI Intolerance   • Morphine GI Intolerance   • Penicillin V Unknown - High Severity   • Penicillins Hives   • Penicillins Itching   • Sulfa Antibiotics Hives   • Sulfa Antibiotics Itching         Discharge Disposition:  Skilled Nursing Facility (DC - External)    Diet:  Hospital:  Diet Order   Procedures   • Diet Regular       Activity:      Restrictions or Other Recommendations:       CODE STATUS:    Code Status and Medical Interventions:   Ordered at: 04/09/21 1529     Limited Support to NOT Include:    Intubation    Cardioversion/Defibrillation     Level Of Support Discussed With:    Patient     Code Status:    No CPR     Medical Interventions (Level of Support Prior to Arrest):    Limited       No future appointments.    Additional Instructions for the Follow-ups that You Need to Schedule     Discharge Follow-up with PCP   As directed       Currently Documented PCP:    Provider, No Known    PCP Phone Number:    None     Follow Up Details: 1 week         Discharge Follow-up with Specified Provider: Dr. Matos; 2 Weeks   As directed      To: Dr. Matos    Follow Up: 2 Weeks    Follow Up Details: Please ensure patient has appt                     Dolores Feldman  DO  04/13/21      Time Spent on Discharge:  I spent  45  minutes on this discharge activity which included: face-to-face encounter with the patient, reviewing the data in the system, coordination of the care with the nursing staff as well as consultants, documentation, and entering orders.

## 2021-04-13 NOTE — DISCHARGE INSTRUCTIONS
Weightbearing as tolerated left upper extremity.  Range of motion as tolerated left upper extremity.  May shower after removal of nerve catheter.  Do not remove incisional dressings.

## 2021-04-13 NOTE — PLAN OF CARE
Pt required prn medicine for pain management. Blood pressure remained under 160 systolic. Pt had bowel movement after receiving Fleets enema and removal of impaction.

## 2021-04-13 NOTE — PLAN OF CARE
Problem: Adult Inpatient Plan of Care  Goal: Plan of Care Review  Outcome: Adequate for Care Transition  Goal: Patient-Specific Goal (Individualized)  Outcome: Adequate for Care Transition  Goal: Absence of Hospital-Acquired Illness or Injury  Outcome: Adequate for Care Transition  Intervention: Identify and Manage Fall Risk  Recent Flowsheet Documentation  Taken 4/13/2021 1200 by Araseli Humphreys RN  Safety Promotion/Fall Prevention:   activity supervised   assistive device/personal items within reach   clutter free environment maintained   fall prevention program maintained   gait belt   toileting scheduled   safety round/check completed   room organization consistent   nonskid shoes/slippers when out of bed  Taken 4/13/2021 1015 by Araseli Humphreys RN  Safety Promotion/Fall Prevention:   activity supervised   assistive device/personal items within reach   clutter free environment maintained   fall prevention program maintained   gait belt   toileting scheduled   safety round/check completed   room organization consistent   nonskid shoes/slippers when out of bed  Taken 4/13/2021 0855 by Araseli Humphreys RN  Safety Promotion/Fall Prevention:   activity supervised   assistive device/personal items within reach   clutter free environment maintained   fall prevention program maintained   gait belt   toileting scheduled   safety round/check completed   room organization consistent   nonskid shoes/slippers when out of bed  Intervention: Prevent Skin Injury  Recent Flowsheet Documentation  Taken 4/13/2021 1200 by Araseli Humphreys RN  Body Position: supine  Skin Protection:   adhesive use limited   transparent dressing maintained   tubing/devices free from skin contact  Taken 4/13/2021 1015 by Araseli Humphreys RN  Body Position: supine  Skin Protection:   adhesive use limited   transparent dressing maintained   tubing/devices free from skin contact  Taken 4/13/2021 0855 by Araseli Humphreys RN  Body Position: sitting up  in bed  Skin Protection:   adhesive use limited   transparent dressing maintained   tubing/devices free from skin contact  Intervention: Prevent and Manage VTE (venous thromboembolism) Risk  Recent Flowsheet Documentation  Taken 4/13/2021 1200 by Araseli Humphreys RN  VTE Prevention/Management:   bilateral   sequential compression devices off  Taken 4/13/2021 1015 by Araseli Humphreys RN  VTE Prevention/Management:   bilateral   sequential compression devices off  Taken 4/13/2021 0855 by Araseli Humphreys RN  VTE Prevention/Management:   bilateral   sequential compression devices off  Intervention: Prevent Infection  Recent Flowsheet Documentation  Taken 4/13/2021 1200 by Araseli Humphreys RN  Infection Prevention: environmental surveillance performed  Taken 4/13/2021 1015 by Araseli Humphreys RN  Infection Prevention: environmental surveillance performed  Taken 4/13/2021 0855 by Araseli Humphreys RN  Infection Prevention: environmental surveillance performed  Goal: Optimal Comfort and Wellbeing  Outcome: Adequate for Care Transition  Intervention: Provide Person-Centered Care  Recent Flowsheet Documentation  Taken 4/13/2021 1200 by Araseli Humphreys RN  Trust Relationship/Rapport: care explained  Taken 4/13/2021 1015 by Araseli Humphreys RN  Trust Relationship/Rapport: care explained  Taken 4/13/2021 0855 by Araseli Humphreys RN  Trust Relationship/Rapport:   care explained   choices provided   questions encouraged   questions answered   reassurance provided   thoughts/feelings acknowledged  Goal: Readiness for Transition of Care  Outcome: Adequate for Care Transition     Problem: Skin Injury Risk Increased  Goal: Skin Health and Integrity  Outcome: Adequate for Care Transition  Intervention: Optimize Skin Protection  Recent Flowsheet Documentation  Taken 4/13/2021 1200 by Araseli Humphreys RN  Pressure Reduction Techniques: frequent weight shift encouraged  Head of Bed (HOB): HOB at 20-30 degrees  Pressure Reduction  Devices: pressure-redistributing mattress utilized  Skin Protection:   adhesive use limited   transparent dressing maintained   tubing/devices free from skin contact  Taken 4/13/2021 1015 by Araseli Humphreys RN  Pressure Reduction Techniques:   frequent weight shift encouraged   weight shift assistance provided  Head of Bed (HOB): HOB at 20-30 degrees  Pressure Reduction Devices: pressure-redistributing mattress utilized  Skin Protection:   adhesive use limited   transparent dressing maintained   tubing/devices free from skin contact  Taken 4/13/2021 0855 by Araseli Humphreys, RN  Pressure Reduction Techniques:   frequent weight shift encouraged   weight shift assistance provided  Head of Bed (HOB): HOB at 90 degrees  Pressure Reduction Devices: pressure-redistributing mattress utilized  Skin Protection:   adhesive use limited   transparent dressing maintained   tubing/devices free from skin contact     Problem: Fall Injury Risk  Goal: Absence of Fall and Fall-Related Injury  Outcome: Adequate for Care Transition  Intervention: Identify and Manage Contributors to Fall Injury Risk  Recent Flowsheet Documentation  Taken 4/13/2021 0855 by Araseli Humphreys, RN  Medication Review/Management: medications reviewed  Intervention: Promote Injury-Free Environment  Recent Flowsheet Documentation  Taken 4/13/2021 1200 by Araseli Humphreys, RN  Safety Promotion/Fall Prevention:   activity supervised   assistive device/personal items within reach   clutter free environment maintained   fall prevention program maintained   gait belt   toileting scheduled   safety round/check completed   room organization consistent   nonskid shoes/slippers when out of bed  Taken 4/13/2021 1015 by Araseli Humphreys, RN  Safety Promotion/Fall Prevention:   activity supervised   assistive device/personal items within reach   clutter free environment maintained   fall prevention program maintained   gait belt   toileting scheduled   safety round/check  completed   room organization consistent   nonskid shoes/slippers when out of bed  Taken 4/13/2021 0855 by Araseli Humphreys, RN  Safety Promotion/Fall Prevention:   activity supervised   assistive device/personal items within reach   clutter free environment maintained   fall prevention program maintained   gait belt   toileting scheduled   safety round/check completed   room organization consistent   nonskid shoes/slippers when out of bed     Problem: Bleeding (Orthopaedic Fracture)  Goal: Absence of Bleeding  Outcome: Adequate for Care Transition     Problem: Bowel Elimination Impaired (Orthopaedic Fracture)  Goal: Effective Bowel Elimination  Outcome: Adequate for Care Transition     Problem: Delayed Union/Nonunion (Orthopaedic Fracture)  Goal: Fracture Stability  Outcome: Adequate for Care Transition     Problem: Embolism (Orthopaedic Fracture)  Goal: Absence of Embolism Signs and Symptoms  Outcome: Adequate for Care Transition  Intervention: Prevent and Manage Embolism Risk  Recent Flowsheet Documentation  Taken 4/13/2021 1200 by Araseli Humphreys RN  VTE Prevention/Management:   bilateral   sequential compression devices off  Taken 4/13/2021 1015 by Araseli Humphreys RN  VTE Prevention/Management:   bilateral   sequential compression devices off  Taken 4/13/2021 0855 by Araseli Humphreys RN  VTE Prevention/Management:   bilateral   sequential compression devices off     Problem: Functional Ability Impaired (Orthopaedic Fracture)  Goal: Optimal Functional Ability  Outcome: Adequate for Care Transition  Intervention: Optimize Functional Ability  Recent Flowsheet Documentation  Taken 4/13/2021 1200 by Araseli Humphreys RN  Positioning/Transfer Devices:   pillows   in use  Taken 4/13/2021 1015 by Araseli Humphreys RN  Positioning/Transfer Devices:   pillows   in use  Taken 4/13/2021 0855 by Araseli Humphreys RN  Positioning/Transfer Devices:   pillows   in use     Problem: Infection (Orthopaedic Fracture)  Goal: Absence  of Infection Signs and Symptoms  Outcome: Adequate for Care Transition  Intervention: Prevent or Manage Infection  Recent Flowsheet Documentation  Taken 4/13/2021 1200 by Araseli Humphreys RN  Infection Prevention: environmental surveillance performed  Taken 4/13/2021 1015 by Araseli Humphreys RN  Infection Prevention: environmental surveillance performed  Taken 4/13/2021 0855 by Araseli Humphreys RN  Infection Prevention: environmental surveillance performed     Problem: Neurovascular Compromise (Orthopaedic Fracture)  Goal: Effective Tissue Perfusion  Outcome: Adequate for Care Transition     Problem: Pain (Orthopaedic Fracture)  Goal: Acceptable Pain Control  Outcome: Adequate for Care Transition  Intervention: Manage Acute Orthopaedic-Related Pain  Recent Flowsheet Documentation  Taken 4/13/2021 1200 by Araseli Humphreys RN  Diversional Activities: television  Taken 4/13/2021 1015 by Araseli Humphreys RN  Diversional Activities: television  Taken 4/13/2021 0855 by Araseli Humphreys RN  Diversional Activities: television     Problem: Respiratory Compromise (Orthopaedic Fracture)  Goal: Effective Oxygenation and Ventilation  Outcome: Adequate for Care Transition  Intervention: Promote Airway Secretion Clearance  Recent Flowsheet Documentation  Taken 4/13/2021 0855 by Araseli Humphreys RN  Cough And Deep Breathing: done independently per patient     Problem: Skin Injury (Orthopaedic Fracture)  Goal: Skin Health and Integrity  Outcome: Adequate for Care Transition  Intervention: Promote Skin Integrity  Recent Flowsheet Documentation  Taken 4/13/2021 1200 by Araseli Humphreys RN  Pressure Reduction Techniques: frequent weight shift encouraged  Head of Bed (HOB): HOB at 20-30 degrees  Pressure Reduction Devices: pressure-redistributing mattress utilized  Taken 4/13/2021 1015 by Araseli Humphreys RN  Pressure Reduction Techniques:   frequent weight shift encouraged   weight shift assistance provided  Head of Bed (HOB): HOB at  20-30 degrees  Pressure Reduction Devices: pressure-redistributing mattress utilized  Taken 4/13/2021 0855 by Araseli Humphreys, RN  Pressure Reduction Techniques:   frequent weight shift encouraged   weight shift assistance provided  Head of Bed (HOB): HOB at 90 degrees  Pressure Reduction Devices: pressure-redistributing mattress utilized     Problem: Urinary Retention (Orthopaedic Fracture)  Goal: Effective Urinary Elimination  Outcome: Adequate for Care Transition  Intervention: Promote Effective Urinary Elimination  Recent Flowsheet Documentation  Taken 4/13/2021 1015 by Araseli Humphreys, RN  Urinary Elimination Promotion:   absorbent pad/diaper use encouraged   toileting offered  Taken 4/13/2021 0855 by Araseli Humphreys, RN  Urinary Elimination Promotion:   absorbent pad/diaper use encouraged   toileting offered     Problem: Bleeding (Surgery Nonspecified)  Goal: Absence of Bleeding  Outcome: Adequate for Care Transition     Problem: Bowel Elimination Impaired (Surgery Nonspecified)  Goal: Effective Bowel Elimination  Outcome: Adequate for Care Transition     Problem: Infection (Surgery Nonspecified)  Goal: Absence of Infection Signs and Symptoms  Outcome: Adequate for Care Transition     Problem: Ongoing Anesthesia Effects (Surgery Nonspecified)  Goal: Anesthesia/Sedation Recovery  Outcome: Adequate for Care Transition  Intervention: Optimize Anesthesia Recovery  Recent Flowsheet Documentation  Taken 4/13/2021 1200 by Araseli Humphreys, RN  Safety Promotion/Fall Prevention:   activity supervised   assistive device/personal items within reach   clutter free environment maintained   fall prevention program maintained   gait belt   toileting scheduled   safety round/check completed   room organization consistent   nonskid shoes/slippers when out of bed  Taken 4/13/2021 1015 by Araseli Humphreys, RN  Safety Promotion/Fall Prevention:   activity supervised   assistive device/personal items within reach   clutter free  environment maintained   fall prevention program maintained   gait belt   toileting scheduled   safety round/check completed   room organization consistent   nonskid shoes/slippers when out of bed  Taken 4/13/2021 0855 by Araseli Humphreys, RN  Safety Promotion/Fall Prevention:   activity supervised   assistive device/personal items within reach   clutter free environment maintained   fall prevention program maintained   gait belt   toileting scheduled   safety round/check completed   room organization consistent   nonskid shoes/slippers when out of bed     Problem: Pain (Surgery Nonspecified)  Goal: Acceptable Pain Control  Outcome: Adequate for Care Transition  Intervention: Prevent or Manage Pain  Recent Flowsheet Documentation  Taken 4/13/2021 1200 by Araseli Humphreys, RN  Diversional Activities: television  Taken 4/13/2021 1015 by Araseli Humphreys RN  Diversional Activities: television  Taken 4/13/2021 0855 by Araseli Humphreys, RN  Diversional Activities: television     Problem: Postoperative Nausea and Vomiting (Surgery Nonspecified)  Goal: Nausea and Vomiting Relief  Outcome: Adequate for Care Transition     Problem: Postoperative Urinary Retention (Surgery Nonspecified)  Goal: Effective Urinary Elimination  Outcome: Adequate for Care Transition  Intervention: Monitor and Manage Urinary Retention  Recent Flowsheet Documentation  Taken 4/13/2021 1015 by Araseli Humphreys, RN  Urinary Elimination Promotion:   absorbent pad/diaper use encouraged   toileting offered  Taken 4/13/2021 0855 by Araseli Humphreys, RN  Urinary Elimination Promotion:   absorbent pad/diaper use encouraged   toileting offered   Goal Outcome Evaluation:

## 2021-04-30 ENCOUNTER — HOSPITAL ENCOUNTER (INPATIENT)
Facility: HOSPITAL | Age: 86
LOS: 5 days | Discharge: HOSPICE | End: 2021-05-05
Attending: EMERGENCY MEDICINE | Admitting: INTERNAL MEDICINE

## 2021-04-30 ENCOUNTER — APPOINTMENT (OUTPATIENT)
Dept: CT IMAGING | Facility: HOSPITAL | Age: 86
End: 2021-04-30

## 2021-04-30 ENCOUNTER — APPOINTMENT (OUTPATIENT)
Dept: GENERAL RADIOLOGY | Facility: HOSPITAL | Age: 86
End: 2021-04-30

## 2021-04-30 ENCOUNTER — APPOINTMENT (OUTPATIENT)
Dept: NUCLEAR MEDICINE | Facility: HOSPITAL | Age: 86
End: 2021-04-30

## 2021-04-30 DIAGNOSIS — Z74.09 IMPAIRED FUNCTIONAL MOBILITY, BALANCE, GAIT, AND ENDURANCE: ICD-10-CM

## 2021-04-30 DIAGNOSIS — A41.9 SEPSIS, DUE TO UNSPECIFIED ORGANISM, UNSPECIFIED WHETHER ACUTE ORGAN DYSFUNCTION PRESENT (HCC): Primary | ICD-10-CM

## 2021-04-30 DIAGNOSIS — J86.9 EMPYEMA LUNG (HCC): ICD-10-CM

## 2021-04-30 DIAGNOSIS — E43 SEVERE MALNUTRITION (HCC): ICD-10-CM

## 2021-04-30 PROBLEM — J18.9 HCAP (HEALTHCARE-ASSOCIATED PNEUMONIA): Status: ACTIVE | Noted: 2021-04-30

## 2021-04-30 PROBLEM — N17.9 ACUTE RENAL FAILURE (ARF) (HCC): Status: ACTIVE | Noted: 2021-04-30

## 2021-04-30 PROBLEM — E87.1 HYPONATREMIA: Status: ACTIVE | Noted: 2021-04-30

## 2021-04-30 LAB
ABO GROUP BLD: NORMAL
ALBUMIN SERPL-MCNC: 3 G/DL (ref 3.5–5.2)
ALBUMIN/GLOB SERPL: 0.8 G/DL
ALP SERPL-CCNC: 156 U/L (ref 39–117)
ALT SERPL W P-5'-P-CCNC: 10 U/L (ref 1–33)
AMORPH URATE CRY URNS QL MICRO: ABNORMAL /HPF
ANION GAP SERPL CALCULATED.3IONS-SCNC: 13 MMOL/L (ref 5–15)
ANTI-E: NORMAL
APPEARANCE FLD: ABNORMAL
APTT PPP: 37.5 SECONDS (ref 22–39)
ARTERIAL PATENCY WRIST A: ABNORMAL
ARTERIAL PATENCY WRIST A: ABNORMAL
AST SERPL-CCNC: 22 U/L (ref 1–32)
ATMOSPHERIC PRESS: ABNORMAL MM[HG]
ATMOSPHERIC PRESS: ABNORMAL MM[HG]
BACTERIA UR QL AUTO: ABNORMAL /HPF
BASE EXCESS BLDA CALC-SCNC: 0.6 MMOL/L (ref 0–2)
BASE EXCESS BLDA CALC-SCNC: 0.8 MMOL/L (ref 0–2)
BASE EXCESS BLDA CALC-SCNC: 2 MMOL/L (ref -5–5)
BASOPHILS # BLD MANUAL: 0 10*3/MM3 (ref 0–0.2)
BASOPHILS NFR BLD AUTO: 0 % (ref 0–1.5)
BDY SITE: ABNORMAL
BDY SITE: ABNORMAL
BILIRUB SERPL-MCNC: 0.6 MG/DL (ref 0–1.2)
BILIRUB UR QL STRIP: ABNORMAL
BLD GP AB SCN SERPL QL: POSITIVE
BODY TEMPERATURE: 37 C
BODY TEMPERATURE: 37 C
BUN SERPL-MCNC: 40 MG/DL (ref 8–23)
BUN/CREAT SERPL: 17 (ref 7–25)
CA-I BLDA-SCNC: 1.25 MMOL/L (ref 1.2–1.32)
CALCIUM SPEC-SCNC: 9.2 MG/DL (ref 8.6–10.5)
CHLORIDE SERPL-SCNC: 93 MMOL/L (ref 98–107)
CLARITY UR: ABNORMAL
CLUMPED PLATELETS: PRESENT
CO2 BLDA-SCNC: 28.3 MMOL/L (ref 22–33)
CO2 BLDA-SCNC: 29.2 MMOL/L (ref 22–33)
CO2 BLDA-SCNC: 30 MMOL/L (ref 24–29)
CO2 SERPL-SCNC: 23 MMOL/L (ref 22–29)
COHGB MFR BLD: 0.9 % (ref 0–2)
COHGB MFR BLD: 1.2 % (ref 0–2)
COLOR FLD: YELLOW
COLOR UR: ABNORMAL
CREAT BLDA-MCNC: 2.4 MG/DL (ref 0.6–1.3)
CREAT SERPL-MCNC: 2.35 MG/DL (ref 0.57–1)
D-LACTATE SERPL-SCNC: 1.1 MMOL/L (ref 0.5–2)
DEPRECATED RDW RBC AUTO: 48.7 FL (ref 37–54)
EOSINOPHIL # BLD MANUAL: 0 10*3/MM3 (ref 0–0.4)
EOSINOPHIL NFR BLD MANUAL: 0 % (ref 0.3–6.2)
EPAP: 0
ERYTHROCYTE [DISTWIDTH] IN BLOOD BY AUTOMATED COUNT: 14.3 % (ref 12.3–15.4)
FLUAV RNA RESP QL NAA+PROBE: NOT DETECTED
FLUBV RNA RESP QL NAA+PROBE: NOT DETECTED
GFR SERPL CREATININE-BSD FRML MDRD: 19 ML/MIN/1.73
GFR SERPL CREATININE-BSD FRML MDRD: 24 ML/MIN/1.73
GLOBULIN UR ELPH-MCNC: 3.8 GM/DL
GLUCOSE BLDC GLUCOMTR-MCNC: 104 MG/DL (ref 70–130)
GLUCOSE SERPL-MCNC: 100 MG/DL (ref 65–99)
GLUCOSE UR STRIP-MCNC: NEGATIVE MG/DL
HCO3 BLDA-SCNC: 26.8 MMOL/L (ref 20–26)
HCO3 BLDA-SCNC: 27.5 MMOL/L (ref 20–26)
HCO3 BLDA-SCNC: 28.3 MMOL/L (ref 22–26)
HCT VFR BLD AUTO: 33.1 % (ref 34–46.6)
HCT VFR BLD CALC: 28.3 %
HCT VFR BLD CALC: 30.2 %
HCT VFR BLDA CALC: 43 % (ref 38–51)
HGB BLD-MCNC: 10.4 G/DL (ref 12–15.9)
HGB BLDA-MCNC: 14.6 G/DL (ref 12–17)
HGB BLDA-MCNC: 9.2 G/DL (ref 14–18)
HGB BLDA-MCNC: 9.9 G/DL (ref 14–18)
HGB UR QL STRIP.AUTO: NEGATIVE
HYALINE CASTS UR QL AUTO: ABNORMAL /LPF
INHALED O2 CONCENTRATION: 28 %
INHALED O2 CONCENTRATION: 32 %
INR PPP: 1.2 (ref 0.85–1.16)
IPAP: 0
KETONES UR QL STRIP: ABNORMAL
LEUKOCYTE ESTERASE UR QL STRIP.AUTO: NEGATIVE
LYMPHOCYTES # BLD MANUAL: 1.11 10*3/MM3 (ref 0.7–3.1)
LYMPHOCYTES NFR BLD MANUAL: 2 % (ref 19.6–45.3)
LYMPHOCYTES NFR BLD MANUAL: 6 % (ref 5–12)
LYMPHOCYTES NFR FLD MANUAL: 1 %
MCH RBC QN AUTO: 29.3 PG (ref 26.6–33)
MCHC RBC AUTO-ENTMCNC: 31.4 G/DL (ref 31.5–35.7)
MCV RBC AUTO: 93.2 FL (ref 79–97)
METAMYELOCYTES NFR BLD MANUAL: 1 % (ref 0–0)
METHGB BLD QL: 0.1 % (ref 0–1.5)
METHGB BLD QL: 0.4 % (ref 0–1.5)
MODALITY: ABNORMAL
MODALITY: ABNORMAL
MONOCYTES # BLD AUTO: 3.34 10*3/MM3 (ref 0.1–0.9)
MONOCYTES NFR FLD: 8 %
MYELOCYTES NFR BLD MANUAL: 1 % (ref 0–0)
NEUTROPHILS # BLD AUTO: 50.13 10*3/MM3 (ref 1.7–7)
NEUTROPHILS NFR BLD MANUAL: 78 % (ref 42.7–76)
NEUTROPHILS NFR FLD MANUAL: 91 %
NEUTS BAND NFR BLD MANUAL: 12 % (ref 0–5)
NITRITE UR QL STRIP: NEGATIVE
NOTE: ABNORMAL
NOTE: ABNORMAL
OXYHGB MFR BLDV: 96 % (ref 94–99)
OXYHGB MFR BLDV: 98.5 % (ref 94–99)
PAW @ PEAK INSP FLOW SETTING VENT: 0 CMH2O
PCO2 BLDA: 49.1 MM HG (ref 35–45)
PCO2 BLDA: 55.2 MM HG (ref 35–45)
PCO2 BLDA: 60.3 MM HG (ref 35–45)
PCO2 TEMP ADJ BLD: 49.1 MM HG (ref 35–45)
PCO2 TEMP ADJ BLD: 55.2 MM HG (ref 35–45)
PH BLDA: 7.28 PH UNITS (ref 7.35–7.6)
PH BLDA: 7.31 PH UNITS (ref 7.35–7.45)
PH BLDA: 7.35 PH UNITS (ref 7.35–7.45)
PH FLD: 7.25 [PH]
PH UR STRIP.AUTO: <=5 [PH] (ref 5–8)
PH, TEMP CORRECTED: 7.31 PH UNITS
PH, TEMP CORRECTED: 7.35 PH UNITS
PLATELET # BLD AUTO: 514 10*3/MM3 (ref 140–450)
PMV BLD AUTO: 10.5 FL (ref 6–12)
PO2 BLDA: 131 MM HG (ref 83–108)
PO2 BLDA: 55 MMHG (ref 80–105)
PO2 BLDA: 97.5 MM HG (ref 83–108)
PO2 TEMP ADJ BLD: 131 MM HG (ref 83–108)
PO2 TEMP ADJ BLD: 97.5 MM HG (ref 83–108)
POLYCHROMASIA BLD QL SMEAR: ABNORMAL
POTASSIUM BLDA-SCNC: 5.7 MMOL/L (ref 3.5–4.9)
POTASSIUM SERPL-SCNC: 5.9 MMOL/L (ref 3.5–5.2)
PROCALCITONIN SERPL-MCNC: 6.6 NG/ML (ref 0–0.25)
PROT SERPL-MCNC: 6.8 G/DL (ref 6–8.5)
PROT UR QL STRIP: ABNORMAL
PROTHROMBIN TIME: 14.9 SECONDS (ref 11.4–14.4)
RBC # BLD AUTO: 3.55 10*6/MM3 (ref 3.77–5.28)
RBC # FLD AUTO: 4000 /MM3
RBC # UR: ABNORMAL /HPF
REF LAB TEST METHOD: ABNORMAL
RH BLD: POSITIVE
SAO2 % BLDA: 84 % (ref 95–98)
SARS-COV-2 RNA RESP QL NAA+PROBE: NOT DETECTED
SMALL PLATELETS BLD QL SMEAR: ABNORMAL
SODIUM BLD-SCNC: 130 MMOL/L (ref 138–146)
SODIUM SERPL-SCNC: 129 MMOL/L (ref 136–145)
SP GR UR STRIP: 1.02 (ref 1–1.03)
SQUAMOUS #/AREA URNS HPF: ABNORMAL /HPF
T&S EXPIRATION DATE: NORMAL
TOTAL RATE: 0 BREATHS/MINUTE
UROBILINOGEN UR QL STRIP: ABNORMAL
VENTILATOR MODE: ABNORMAL
WBC # BLD AUTO: 55.7 10*3/MM3 (ref 3.4–10.8)
WBC # FLD AUTO: ABNORMAL /MM3
WBC MORPH BLD: NORMAL
WBC UR QL AUTO: ABNORMAL /HPF

## 2021-04-30 PROCEDURE — 82805 BLOOD GASES W/O2 SATURATION: CPT

## 2021-04-30 PROCEDURE — 85007 BL SMEAR W/DIFF WBC COUNT: CPT | Performed by: EMERGENCY MEDICINE

## 2021-04-30 PROCEDURE — 87116 MYCOBACTERIA CULTURE: CPT | Performed by: EMERGENCY MEDICINE

## 2021-04-30 PROCEDURE — 78580 LUNG PERFUSION IMAGING: CPT

## 2021-04-30 PROCEDURE — 71045 X-RAY EXAM CHEST 1 VIEW: CPT

## 2021-04-30 PROCEDURE — P9612 CATHETERIZE FOR URINE SPEC: HCPCS

## 2021-04-30 PROCEDURE — 75989 ABSCESS DRAINAGE UNDER X-RAY: CPT

## 2021-04-30 PROCEDURE — 25010000002 VANCOMYCIN PER 500 MG: Performed by: EMERGENCY MEDICINE

## 2021-04-30 PROCEDURE — 82565 ASSAY OF CREATININE: CPT

## 2021-04-30 PROCEDURE — 71250 CT THORAX DX C-: CPT

## 2021-04-30 PROCEDURE — 80053 COMPREHEN METABOLIC PANEL: CPT | Performed by: EMERGENCY MEDICINE

## 2021-04-30 PROCEDURE — 85730 THROMBOPLASTIN TIME PARTIAL: CPT | Performed by: EMERGENCY MEDICINE

## 2021-04-30 PROCEDURE — 0W9B3ZX DRAINAGE OF LEFT PLEURAL CAVITY, PERCUTANEOUS APPROACH, DIAGNOSTIC: ICD-10-PCS | Performed by: INTERNAL MEDICINE

## 2021-04-30 PROCEDURE — 87206 SMEAR FLUORESCENT/ACID STAI: CPT | Performed by: EMERGENCY MEDICINE

## 2021-04-30 PROCEDURE — 83050 HGB METHEMOGLOBIN QUAN: CPT

## 2021-04-30 PROCEDURE — 86870 RBC ANTIBODY IDENTIFICATION: CPT | Performed by: EMERGENCY MEDICINE

## 2021-04-30 PROCEDURE — 25010000002 METHYLPREDNISOLONE PER 40 MG: Performed by: INTERNAL MEDICINE

## 2021-04-30 PROCEDURE — 85014 HEMATOCRIT: CPT

## 2021-04-30 PROCEDURE — 36600 WITHDRAWAL OF ARTERIAL BLOOD: CPT

## 2021-04-30 PROCEDURE — 94799 UNLISTED PULMONARY SVC/PX: CPT

## 2021-04-30 PROCEDURE — 83605 ASSAY OF LACTIC ACID: CPT | Performed by: EMERGENCY MEDICINE

## 2021-04-30 PROCEDURE — 87040 BLOOD CULTURE FOR BACTERIA: CPT | Performed by: EMERGENCY MEDICINE

## 2021-04-30 PROCEDURE — 82375 ASSAY CARBOXYHB QUANT: CPT

## 2021-04-30 PROCEDURE — 82803 BLOOD GASES ANY COMBINATION: CPT

## 2021-04-30 PROCEDURE — 93005 ELECTROCARDIOGRAM TRACING: CPT | Performed by: EMERGENCY MEDICINE

## 2021-04-30 PROCEDURE — 87150 DNA/RNA AMPLIFIED PROBE: CPT | Performed by: EMERGENCY MEDICINE

## 2021-04-30 PROCEDURE — 89051 BODY FLUID CELL COUNT: CPT | Performed by: EMERGENCY MEDICINE

## 2021-04-30 PROCEDURE — 82947 ASSAY GLUCOSE BLOOD QUANT: CPT

## 2021-04-30 PROCEDURE — 83615 LACTATE (LD) (LDH) ENZYME: CPT | Performed by: EMERGENCY MEDICINE

## 2021-04-30 PROCEDURE — 87015 SPECIMEN INFECT AGNT CONCNTJ: CPT | Performed by: EMERGENCY MEDICINE

## 2021-04-30 PROCEDURE — 84132 ASSAY OF SERUM POTASSIUM: CPT

## 2021-04-30 PROCEDURE — 87205 SMEAR GRAM STAIN: CPT | Performed by: EMERGENCY MEDICINE

## 2021-04-30 PROCEDURE — 99291 CRITICAL CARE FIRST HOUR: CPT | Performed by: INTERNAL MEDICINE

## 2021-04-30 PROCEDURE — 81001 URINALYSIS AUTO W/SCOPE: CPT | Performed by: EMERGENCY MEDICINE

## 2021-04-30 PROCEDURE — 99223 1ST HOSP IP/OBS HIGH 75: CPT | Performed by: INTERNAL MEDICINE

## 2021-04-30 PROCEDURE — 86900 BLOOD TYPING SEROLOGIC ABO: CPT | Performed by: EMERGENCY MEDICINE

## 2021-04-30 PROCEDURE — 82945 GLUCOSE OTHER FLUID: CPT | Performed by: EMERGENCY MEDICINE

## 2021-04-30 PROCEDURE — 86850 RBC ANTIBODY SCREEN: CPT | Performed by: EMERGENCY MEDICINE

## 2021-04-30 PROCEDURE — 83986 ASSAY PH BODY FLUID NOS: CPT | Performed by: EMERGENCY MEDICINE

## 2021-04-30 PROCEDURE — 82330 ASSAY OF CALCIUM: CPT

## 2021-04-30 PROCEDURE — 87636 SARSCOV2 & INF A&B AMP PRB: CPT | Performed by: EMERGENCY MEDICINE

## 2021-04-30 PROCEDURE — 87070 CULTURE OTHR SPECIMN AEROBIC: CPT | Performed by: EMERGENCY MEDICINE

## 2021-04-30 PROCEDURE — 87075 CULTR BACTERIA EXCEPT BLOOD: CPT | Performed by: EMERGENCY MEDICINE

## 2021-04-30 PROCEDURE — 84157 ASSAY OF PROTEIN OTHER: CPT | Performed by: EMERGENCY MEDICINE

## 2021-04-30 PROCEDURE — 25010000002 MEROPENEM PER 100 MG: Performed by: EMERGENCY MEDICINE

## 2021-04-30 PROCEDURE — 0 TECHNETIUM ALBUMIN AGGREGATED: Performed by: EMERGENCY MEDICINE

## 2021-04-30 PROCEDURE — 99285 EMERGENCY DEPT VISIT HI MDM: CPT

## 2021-04-30 PROCEDURE — 25010000003 LIDOCAINE 1 % SOLUTION: Performed by: RADIOLOGY

## 2021-04-30 PROCEDURE — 84295 ASSAY OF SERUM SODIUM: CPT

## 2021-04-30 PROCEDURE — 25010000002 FENTANYL CITRATE (PF) 100 MCG/2ML SOLUTION: Performed by: EMERGENCY MEDICINE

## 2021-04-30 PROCEDURE — 82042 OTHER SOURCE ALBUMIN QUAN EA: CPT | Performed by: EMERGENCY MEDICINE

## 2021-04-30 PROCEDURE — A9540 TC99M MAA: HCPCS | Performed by: EMERGENCY MEDICINE

## 2021-04-30 PROCEDURE — 84145 PROCALCITONIN (PCT): CPT | Performed by: EMERGENCY MEDICINE

## 2021-04-30 PROCEDURE — 25010000002 HYDROMORPHONE PER 4 MG: Performed by: INTERNAL MEDICINE

## 2021-04-30 PROCEDURE — 85610 PROTHROMBIN TIME: CPT | Performed by: EMERGENCY MEDICINE

## 2021-04-30 PROCEDURE — 85025 COMPLETE CBC W/AUTO DIFF WBC: CPT | Performed by: EMERGENCY MEDICINE

## 2021-04-30 PROCEDURE — 86901 BLOOD TYPING SEROLOGIC RH(D): CPT | Performed by: EMERGENCY MEDICINE

## 2021-04-30 RX ORDER — SODIUM PHOSPHATE, DIBASIC AND SODIUM PHOSPHATE, MONOBASIC 7; 19 G/133ML; G/133ML
1 ENEMA RECTAL
COMMUNITY

## 2021-04-30 RX ORDER — HYDROMORPHONE HYDROCHLORIDE 1 MG/ML
0.25 INJECTION, SOLUTION INTRAMUSCULAR; INTRAVENOUS; SUBCUTANEOUS
Status: DISCONTINUED | OUTPATIENT
Start: 2021-04-30 | End: 2021-05-05 | Stop reason: HOSPADM

## 2021-04-30 RX ORDER — HYDROCODONE BITARTRATE AND ACETAMINOPHEN 5; 325 MG/1; MG/1
1 TABLET ORAL EVERY 6 HOURS PRN
COMMUNITY
End: 2021-05-05 | Stop reason: HOSPADM

## 2021-04-30 RX ORDER — DOCUSATE SODIUM 100 MG/1
100 CAPSULE, LIQUID FILLED ORAL 2 TIMES DAILY
Status: DISCONTINUED | OUTPATIENT
Start: 2021-04-30 | End: 2021-05-05 | Stop reason: HOSPADM

## 2021-04-30 RX ORDER — ONDANSETRON 2 MG/ML
4 INJECTION INTRAMUSCULAR; INTRAVENOUS EVERY 6 HOURS PRN
Status: DISCONTINUED | OUTPATIENT
Start: 2021-04-30 | End: 2021-05-05 | Stop reason: HOSPADM

## 2021-04-30 RX ORDER — METHYLPREDNISOLONE SODIUM SUCCINATE 40 MG/ML
40 INJECTION, POWDER, LYOPHILIZED, FOR SOLUTION INTRAMUSCULAR; INTRAVENOUS ONCE
Status: COMPLETED | OUTPATIENT
Start: 2021-04-30 | End: 2021-04-30

## 2021-04-30 RX ORDER — SODIUM CHLORIDE 0.9 % (FLUSH) 0.9 %
10 SYRINGE (ML) INJECTION EVERY 12 HOURS SCHEDULED
Status: DISCONTINUED | OUTPATIENT
Start: 2021-04-30 | End: 2021-05-05 | Stop reason: HOSPADM

## 2021-04-30 RX ORDER — SODIUM CHLORIDE 9 MG/ML
50 INJECTION, SOLUTION INTRAVENOUS CONTINUOUS
Status: DISCONTINUED | OUTPATIENT
Start: 2021-04-30 | End: 2021-05-05 | Stop reason: HOSPADM

## 2021-04-30 RX ORDER — FENTANYL CITRATE 50 UG/ML
50 INJECTION, SOLUTION INTRAMUSCULAR; INTRAVENOUS ONCE
Status: COMPLETED | OUTPATIENT
Start: 2021-04-30 | End: 2021-04-30

## 2021-04-30 RX ORDER — SODIUM CHLORIDE 0.9 % (FLUSH) 0.9 %
10 SYRINGE (ML) INJECTION AS NEEDED
Status: DISCONTINUED | OUTPATIENT
Start: 2021-04-30 | End: 2021-05-05 | Stop reason: HOSPADM

## 2021-04-30 RX ORDER — ACETAMINOPHEN 325 MG/1
650 TABLET ORAL EVERY 4 HOURS PRN
Status: DISCONTINUED | OUTPATIENT
Start: 2021-04-30 | End: 2021-05-05 | Stop reason: HOSPADM

## 2021-04-30 RX ORDER — IPRATROPIUM BROMIDE AND ALBUTEROL SULFATE 2.5; .5 MG/3ML; MG/3ML
3 SOLUTION RESPIRATORY (INHALATION)
Status: DISCONTINUED | OUTPATIENT
Start: 2021-04-30 | End: 2021-05-02

## 2021-04-30 RX ORDER — LIDOCAINE HYDROCHLORIDE 10 MG/ML
20 INJECTION, SOLUTION INFILTRATION; PERINEURAL ONCE
Status: COMPLETED | OUTPATIENT
Start: 2021-04-30 | End: 2021-04-30

## 2021-04-30 RX ORDER — VANCOMYCIN HYDROCHLORIDE 1 G/200ML
20 INJECTION, SOLUTION INTRAVENOUS ONCE
Status: COMPLETED | OUTPATIENT
Start: 2021-04-30 | End: 2021-04-30

## 2021-04-30 RX ADMIN — IPRATROPIUM BROMIDE AND ALBUTEROL SULFATE 3 ML: 2.5; .5 SOLUTION RESPIRATORY (INHALATION) at 23:41

## 2021-04-30 RX ADMIN — SODIUM CHLORIDE, PRESERVATIVE FREE 10 ML: 5 INJECTION INTRAVENOUS at 20:10

## 2021-04-30 RX ADMIN — SODIUM CHLORIDE 50 ML/HR: 9 INJECTION, SOLUTION INTRAVENOUS at 20:11

## 2021-04-30 RX ADMIN — KIT FOR THE PREPARATION OF TECHNETIUM TC 99M ALBUMIN AGGREGATED 1 DOSE: 2.5 INJECTION, POWDER, FOR SOLUTION INTRAVENOUS at 11:50

## 2021-04-30 RX ADMIN — LIDOCAINE HYDROCHLORIDE 20 ML: 10 INJECTION, SOLUTION INFILTRATION; PERINEURAL at 16:21

## 2021-04-30 RX ADMIN — FENTANYL CITRATE 50 MCG: 50 INJECTION, SOLUTION INTRAMUSCULAR; INTRAVENOUS at 16:48

## 2021-04-30 RX ADMIN — MEROPENEM 1 G: 1 INJECTION, POWDER, FOR SOLUTION INTRAVENOUS at 12:19

## 2021-04-30 RX ADMIN — METHYLPREDNISOLONE SODIUM SUCCINATE 40 MG: 40 INJECTION, POWDER, FOR SOLUTION INTRAMUSCULAR; INTRAVENOUS at 20:10

## 2021-04-30 RX ADMIN — HYDROMORPHONE HYDROCHLORIDE 0.25 MG: 1 INJECTION, SOLUTION INTRAMUSCULAR; INTRAVENOUS; SUBCUTANEOUS at 20:10

## 2021-04-30 RX ADMIN — VANCOMYCIN HYDROCHLORIDE 1000 MG: 1 INJECTION, SOLUTION INTRAVENOUS at 13:06

## 2021-04-30 RX ADMIN — SODIUM CHLORIDE, POTASSIUM CHLORIDE, SODIUM LACTATE AND CALCIUM CHLORIDE 1416 ML: 600; 310; 30; 20 INJECTION, SOLUTION INTRAVENOUS at 11:31

## 2021-05-01 LAB
ALBUMIN FLD-MCNC: 2.5 G/DL
ALBUMIN SERPL-MCNC: 2.3 G/DL (ref 3.5–5.2)
ALBUMIN/GLOB SERPL: 0.7 G/DL
ALP SERPL-CCNC: 136 U/L (ref 39–117)
ALT SERPL W P-5'-P-CCNC: 10 U/L (ref 1–33)
ANION GAP SERPL CALCULATED.3IONS-SCNC: 10 MMOL/L (ref 5–15)
AST SERPL-CCNC: 17 U/L (ref 1–32)
BACTERIA BLD CULT: ABNORMAL
BASOPHILS # BLD MANUAL: 0 10*3/MM3 (ref 0–0.2)
BASOPHILS NFR BLD AUTO: 0 % (ref 0–1.5)
BILIRUB SERPL-MCNC: 0.2 MG/DL (ref 0–1.2)
BOTTLE TYPE: ABNORMAL
BUN SERPL-MCNC: 47 MG/DL (ref 8–23)
BUN/CREAT SERPL: 27.8 (ref 7–25)
CALCIUM SPEC-SCNC: 8.7 MG/DL (ref 8.6–10.5)
CHLORIDE SERPL-SCNC: 98 MMOL/L (ref 98–107)
CO2 SERPL-SCNC: 24 MMOL/L (ref 22–29)
CREAT SERPL-MCNC: 1.69 MG/DL (ref 0.57–1)
CREAT UR-MCNC: 94.2 MG/DL
DEPRECATED RDW RBC AUTO: 48 FL (ref 37–54)
EOSINOPHIL # BLD MANUAL: 0 10*3/MM3 (ref 0–0.4)
EOSINOPHIL NFR BLD MANUAL: 0 % (ref 0.3–6.2)
ERYTHROCYTE [DISTWIDTH] IN BLOOD BY AUTOMATED COUNT: 14.2 % (ref 12.3–15.4)
GFR SERPL CREATININE-BSD FRML MDRD: 28 ML/MIN/1.73
GFR SERPL CREATININE-BSD FRML MDRD: 35 ML/MIN/1.73
GIANT PLATELETS: NORMAL
GLOBULIN UR ELPH-MCNC: 3.3 GM/DL
GLUCOSE FLD-MCNC: 66 MG/DL
GLUCOSE SERPL-MCNC: 121 MG/DL (ref 65–99)
HCT VFR BLD AUTO: 27.3 % (ref 34–46.6)
HGB BLD-MCNC: 8.4 G/DL (ref 12–15.9)
L PNEUMO1 AG UR QL IA: NEGATIVE
LDH FLD-CCNC: 957 U/L
LYMPHOCYTES # BLD MANUAL: 0.34 10*3/MM3 (ref 0.7–3.1)
LYMPHOCYTES NFR BLD MANUAL: 0 % (ref 5–12)
LYMPHOCYTES NFR BLD MANUAL: 1 % (ref 19.6–45.3)
MCH RBC QN AUTO: 28.6 PG (ref 26.6–33)
MCHC RBC AUTO-ENTMCNC: 30.8 G/DL (ref 31.5–35.7)
MCV RBC AUTO: 92.9 FL (ref 79–97)
MONOCYTES # BLD AUTO: 0 10*3/MM3 (ref 0.1–0.9)
MRSA DNA SPEC QL NAA+PROBE: NEGATIVE
NEUTROPHILS # BLD AUTO: 33.46 10*3/MM3 (ref 1.7–7)
NEUTROPHILS NFR BLD MANUAL: 99 % (ref 42.7–76)
PLAT MORPH BLD: NORMAL
PLATELET # BLD AUTO: 426 10*3/MM3 (ref 140–450)
PMV BLD AUTO: 10.6 FL (ref 6–12)
POTASSIUM SERPL-SCNC: 5.6 MMOL/L (ref 3.5–5.2)
PROT FLD-MCNC: 4.3 G/DL
PROT SERPL-MCNC: 5.6 G/DL (ref 6–8.5)
RBC # BLD AUTO: 2.94 10*6/MM3 (ref 3.77–5.28)
RBC MORPH BLD: NORMAL
S PNEUM AG SPEC QL LA: NEGATIVE
SCAN SLIDE: NORMAL
SMALL PLATELETS BLD QL SMEAR: NORMAL
SODIUM SERPL-SCNC: 132 MMOL/L (ref 136–145)
SODIUM UR-SCNC: <20 MMOL/L
UUN 24H UR-MCNC: 568 MG/DL
VANCOMYCIN SERPL-MCNC: 10.8 MCG/ML (ref 5–40)
WBC # BLD AUTO: 33.8 10*3/MM3 (ref 3.4–10.8)
WBC MORPH BLD: NORMAL

## 2021-05-01 PROCEDURE — 97535 SELF CARE MNGMENT TRAINING: CPT

## 2021-05-01 PROCEDURE — 25010000002 MEROPENEM: Performed by: INTERNAL MEDICINE

## 2021-05-01 PROCEDURE — 25010000002 HEPARIN (PORCINE) PER 1000 UNITS: Performed by: INTERNAL MEDICINE

## 2021-05-01 PROCEDURE — 25010000002 MEROPENEM PER 100 MG: Performed by: INTERNAL MEDICINE

## 2021-05-01 PROCEDURE — 84540 ASSAY OF URINE/UREA-N: CPT | Performed by: INTERNAL MEDICINE

## 2021-05-01 PROCEDURE — 97166 OT EVAL MOD COMPLEX 45 MIN: CPT

## 2021-05-01 PROCEDURE — 25010000002 VANCOMYCIN PER 500 MG

## 2021-05-01 PROCEDURE — 80202 ASSAY OF VANCOMYCIN: CPT | Performed by: INTERNAL MEDICINE

## 2021-05-01 PROCEDURE — 84300 ASSAY OF URINE SODIUM: CPT | Performed by: INTERNAL MEDICINE

## 2021-05-01 PROCEDURE — 80053 COMPREHEN METABOLIC PANEL: CPT | Performed by: INTERNAL MEDICINE

## 2021-05-01 PROCEDURE — 94799 UNLISTED PULMONARY SVC/PX: CPT

## 2021-05-01 PROCEDURE — 87899 AGENT NOS ASSAY W/OPTIC: CPT | Performed by: INTERNAL MEDICINE

## 2021-05-01 PROCEDURE — 82570 ASSAY OF URINE CREATININE: CPT | Performed by: INTERNAL MEDICINE

## 2021-05-01 PROCEDURE — 99233 SBSQ HOSP IP/OBS HIGH 50: CPT | Performed by: INTERNAL MEDICINE

## 2021-05-01 PROCEDURE — 85025 COMPLETE CBC W/AUTO DIFF WBC: CPT | Performed by: INTERNAL MEDICINE

## 2021-05-01 PROCEDURE — 87641 MR-STAPH DNA AMP PROBE: CPT

## 2021-05-01 PROCEDURE — 25010000002 HYDROMORPHONE PER 4 MG: Performed by: INTERNAL MEDICINE

## 2021-05-01 RX ORDER — HEPARIN SODIUM 5000 [USP'U]/ML
5000 INJECTION, SOLUTION INTRAVENOUS; SUBCUTANEOUS EVERY 8 HOURS SCHEDULED
Status: DISCONTINUED | OUTPATIENT
Start: 2021-05-01 | End: 2021-05-05 | Stop reason: HOSPADM

## 2021-05-01 RX ORDER — POLYETHYLENE GLYCOL 3350 17 G/17G
17 POWDER, FOR SOLUTION ORAL DAILY
Status: DISCONTINUED | OUTPATIENT
Start: 2021-05-01 | End: 2021-05-05 | Stop reason: HOSPADM

## 2021-05-01 RX ADMIN — IPRATROPIUM BROMIDE AND ALBUTEROL SULFATE 3 ML: 2.5; .5 SOLUTION RESPIRATORY (INHALATION) at 15:18

## 2021-05-01 RX ADMIN — SODIUM CHLORIDE, PRESERVATIVE FREE 10 ML: 5 INJECTION INTRAVENOUS at 08:54

## 2021-05-01 RX ADMIN — SODIUM CHLORIDE, PRESERVATIVE FREE 10 ML: 5 INJECTION INTRAVENOUS at 22:29

## 2021-05-01 RX ADMIN — IPRATROPIUM BROMIDE AND ALBUTEROL SULFATE 3 ML: 2.5; .5 SOLUTION RESPIRATORY (INHALATION) at 18:39

## 2021-05-01 RX ADMIN — IPRATROPIUM BROMIDE AND ALBUTEROL SULFATE 3 ML: 2.5; .5 SOLUTION RESPIRATORY (INHALATION) at 22:32

## 2021-05-01 RX ADMIN — IPRATROPIUM BROMIDE AND ALBUTEROL SULFATE 3 ML: 2.5; .5 SOLUTION RESPIRATORY (INHALATION) at 12:20

## 2021-05-01 RX ADMIN — HEPARIN SODIUM 5000 UNITS: 5000 INJECTION INTRAVENOUS; SUBCUTANEOUS at 22:28

## 2021-05-01 RX ADMIN — MEROPENEM 1 G: 1 INJECTION, POWDER, FOR SOLUTION INTRAVENOUS at 22:28

## 2021-05-01 RX ADMIN — MEROPENEM 1 G: 1 INJECTION, POWDER, FOR SOLUTION INTRAVENOUS at 00:32

## 2021-05-01 RX ADMIN — DOCUSATE SODIUM 100 MG: 100 CAPSULE ORAL at 22:27

## 2021-05-01 RX ADMIN — MEROPENEM 1 G: 1 INJECTION, POWDER, FOR SOLUTION INTRAVENOUS at 12:52

## 2021-05-01 RX ADMIN — POLYETHYLENE GLYCOL 3350 17 G: 17 POWDER, FOR SOLUTION ORAL at 08:58

## 2021-05-01 RX ADMIN — VANCOMYCIN HYDROCHLORIDE 750 MG: 750 INJECTION, SOLUTION INTRAVENOUS at 06:28

## 2021-05-01 RX ADMIN — HYDROMORPHONE HYDROCHLORIDE 0.25 MG: 1 INJECTION, SOLUTION INTRAMUSCULAR; INTRAVENOUS; SUBCUTANEOUS at 08:54

## 2021-05-01 RX ADMIN — HEPARIN SODIUM 5000 UNITS: 5000 INJECTION INTRAVENOUS; SUBCUTANEOUS at 18:18

## 2021-05-02 PROBLEM — E43 SEVERE MALNUTRITION (HCC): Status: ACTIVE | Noted: 2021-05-02

## 2021-05-02 LAB
ALBUMIN SERPL-MCNC: 2.5 G/DL (ref 3.5–5.2)
ALBUMIN/GLOB SERPL: 0.7 G/DL
ALP SERPL-CCNC: 145 U/L (ref 39–117)
ALT SERPL W P-5'-P-CCNC: 13 U/L (ref 1–33)
ANION GAP SERPL CALCULATED.3IONS-SCNC: 10 MMOL/L (ref 5–15)
AST SERPL-CCNC: 22 U/L (ref 1–32)
BACTERIA SPEC AEROBE CULT: ABNORMAL
BACTERIA SPEC AEROBE CULT: ABNORMAL
BASOPHILS # BLD AUTO: 0.06 10*3/MM3 (ref 0–0.2)
BASOPHILS NFR BLD AUTO: 0.2 % (ref 0–1.5)
BILIRUB SERPL-MCNC: 0.2 MG/DL (ref 0–1.2)
BUN SERPL-MCNC: 49 MG/DL (ref 8–23)
BUN/CREAT SERPL: 48 (ref 7–25)
CALCIUM SPEC-SCNC: 9.3 MG/DL (ref 8.6–10.5)
CHLORIDE SERPL-SCNC: 103 MMOL/L (ref 98–107)
CO2 SERPL-SCNC: 23 MMOL/L (ref 22–29)
CREAT SERPL-MCNC: 1.02 MG/DL (ref 0.57–1)
DEPRECATED RDW RBC AUTO: 47.8 FL (ref 37–54)
EOSINOPHIL # BLD AUTO: 0 10*3/MM3 (ref 0–0.4)
EOSINOPHIL NFR BLD AUTO: 0 % (ref 0.3–6.2)
ERYTHROCYTE [DISTWIDTH] IN BLOOD BY AUTOMATED COUNT: 14.5 % (ref 12.3–15.4)
GFR SERPL CREATININE-BSD FRML MDRD: 51 ML/MIN/1.73
GFR SERPL CREATININE-BSD FRML MDRD: 62 ML/MIN/1.73
GLOBULIN UR ELPH-MCNC: 3.7 GM/DL
GLUCOSE SERPL-MCNC: 113 MG/DL (ref 65–99)
GRAM STN SPEC: ABNORMAL
GRAM STN SPEC: ABNORMAL
HCT VFR BLD AUTO: 29 % (ref 34–46.6)
HGB BLD-MCNC: 9.1 G/DL (ref 12–15.9)
IMM GRANULOCYTES # BLD AUTO: 0.49 10*3/MM3 (ref 0–0.05)
IMM GRANULOCYTES NFR BLD AUTO: 1.4 % (ref 0–0.5)
ISOLATED FROM: ABNORMAL
ISOLATED FROM: ABNORMAL
LYMPHOCYTES # BLD AUTO: 0.88 10*3/MM3 (ref 0.7–3.1)
LYMPHOCYTES NFR BLD AUTO: 2.4 % (ref 19.6–45.3)
MAGNESIUM SERPL-MCNC: 2.3 MG/DL (ref 1.6–2.4)
MCH RBC QN AUTO: 28.3 PG (ref 26.6–33)
MCHC RBC AUTO-ENTMCNC: 31.4 G/DL (ref 31.5–35.7)
MCV RBC AUTO: 90.1 FL (ref 79–97)
MONOCYTES # BLD AUTO: 1.59 10*3/MM3 (ref 0.1–0.9)
MONOCYTES NFR BLD AUTO: 4.4 % (ref 5–12)
NEUTROPHILS NFR BLD AUTO: 32.98 10*3/MM3 (ref 1.7–7)
NEUTROPHILS NFR BLD AUTO: 91.6 % (ref 42.7–76)
NRBC BLD AUTO-RTO: 0 /100 WBC (ref 0–0.2)
PLATELET # BLD AUTO: 524 10*3/MM3 (ref 140–450)
PMV BLD AUTO: 11.3 FL (ref 6–12)
POTASSIUM SERPL-SCNC: 4.9 MMOL/L (ref 3.5–5.2)
PROCALCITONIN SERPL-MCNC: 6.84 NG/ML (ref 0–0.25)
PROT SERPL-MCNC: 6.2 G/DL (ref 6–8.5)
QT INTERVAL: 322 MS
QTC INTERVAL: 408 MS
RBC # BLD AUTO: 3.22 10*6/MM3 (ref 3.77–5.28)
SODIUM SERPL-SCNC: 136 MMOL/L (ref 136–145)
WBC # BLD AUTO: 36 10*3/MM3 (ref 3.4–10.8)

## 2021-05-02 PROCEDURE — 94799 UNLISTED PULMONARY SVC/PX: CPT

## 2021-05-02 PROCEDURE — 25010000002 HYDROMORPHONE PER 4 MG: Performed by: INTERNAL MEDICINE

## 2021-05-02 PROCEDURE — 83735 ASSAY OF MAGNESIUM: CPT | Performed by: INTERNAL MEDICINE

## 2021-05-02 PROCEDURE — 97530 THERAPEUTIC ACTIVITIES: CPT

## 2021-05-02 PROCEDURE — 25010000002 HEPARIN (PORCINE) PER 1000 UNITS: Performed by: INTERNAL MEDICINE

## 2021-05-02 PROCEDURE — 25010000002 MEROPENEM PER 100 MG: Performed by: INTERNAL MEDICINE

## 2021-05-02 PROCEDURE — 84145 PROCALCITONIN (PCT): CPT | Performed by: INTERNAL MEDICINE

## 2021-05-02 PROCEDURE — 25010000002 LORAZEPAM PER 2 MG: Performed by: NURSE PRACTITIONER

## 2021-05-02 PROCEDURE — 97161 PT EVAL LOW COMPLEX 20 MIN: CPT

## 2021-05-02 PROCEDURE — 85025 COMPLETE CBC W/AUTO DIFF WBC: CPT | Performed by: INTERNAL MEDICINE

## 2021-05-02 PROCEDURE — 99233 SBSQ HOSP IP/OBS HIGH 50: CPT | Performed by: INTERNAL MEDICINE

## 2021-05-02 PROCEDURE — 80053 COMPREHEN METABOLIC PANEL: CPT | Performed by: INTERNAL MEDICINE

## 2021-05-02 PROCEDURE — 92610 EVALUATE SWALLOWING FUNCTION: CPT

## 2021-05-02 RX ORDER — IPRATROPIUM BROMIDE AND ALBUTEROL SULFATE 2.5; .5 MG/3ML; MG/3ML
3 SOLUTION RESPIRATORY (INHALATION) EVERY 4 HOURS PRN
Status: DISCONTINUED | OUTPATIENT
Start: 2021-05-02 | End: 2021-05-05 | Stop reason: HOSPADM

## 2021-05-02 RX ORDER — LORAZEPAM 2 MG/ML
0.25 INJECTION INTRAMUSCULAR ONCE
Status: COMPLETED | OUTPATIENT
Start: 2021-05-02 | End: 2021-05-02

## 2021-05-02 RX ADMIN — HEPARIN SODIUM 5000 UNITS: 5000 INJECTION INTRAVENOUS; SUBCUTANEOUS at 22:06

## 2021-05-02 RX ADMIN — IPRATROPIUM BROMIDE AND ALBUTEROL SULFATE 3 ML: 2.5; .5 SOLUTION RESPIRATORY (INHALATION) at 12:37

## 2021-05-02 RX ADMIN — HEPARIN SODIUM 5000 UNITS: 5000 INJECTION INTRAVENOUS; SUBCUTANEOUS at 14:14

## 2021-05-02 RX ADMIN — MEROPENEM 1 G: 1 INJECTION, POWDER, FOR SOLUTION INTRAVENOUS at 23:00

## 2021-05-02 RX ADMIN — DOCUSATE SODIUM 100 MG: 100 CAPSULE ORAL at 09:12

## 2021-05-02 RX ADMIN — HYDROCODONE BITARTRATE AND ACETAMINOPHEN 10 ML: 7.5; 325 SOLUTION ORAL at 14:15

## 2021-05-02 RX ADMIN — IPRATROPIUM BROMIDE AND ALBUTEROL SULFATE 3 ML: 2.5; .5 SOLUTION RESPIRATORY (INHALATION) at 02:51

## 2021-05-02 RX ADMIN — SODIUM CHLORIDE, PRESERVATIVE FREE 10 ML: 5 INJECTION INTRAVENOUS at 22:08

## 2021-05-02 RX ADMIN — DOCUSATE SODIUM 100 MG: 100 CAPSULE ORAL at 22:07

## 2021-05-02 RX ADMIN — MEROPENEM 1 G: 1 INJECTION, POWDER, FOR SOLUTION INTRAVENOUS at 14:14

## 2021-05-02 RX ADMIN — LORAZEPAM 0.25 MG: 2 INJECTION INTRAMUSCULAR; INTRAVENOUS at 22:58

## 2021-05-02 RX ADMIN — IPRATROPIUM BROMIDE AND ALBUTEROL SULFATE 3 ML: 2.5; .5 SOLUTION RESPIRATORY (INHALATION) at 23:39

## 2021-05-02 RX ADMIN — POLYETHYLENE GLYCOL 3350 17 G: 17 POWDER, FOR SOLUTION ORAL at 09:11

## 2021-05-02 RX ADMIN — HYDROMORPHONE HYDROCHLORIDE 0.25 MG: 1 INJECTION, SOLUTION INTRAMUSCULAR; INTRAVENOUS; SUBCUTANEOUS at 10:43

## 2021-05-02 RX ADMIN — HEPARIN SODIUM 5000 UNITS: 5000 INJECTION INTRAVENOUS; SUBCUTANEOUS at 05:32

## 2021-05-02 RX ADMIN — HYDROMORPHONE HYDROCHLORIDE 0.25 MG: 1 INJECTION, SOLUTION INTRAMUSCULAR; INTRAVENOUS; SUBCUTANEOUS at 05:32

## 2021-05-02 RX ADMIN — HYDROCODONE BITARTRATE AND ACETAMINOPHEN 10 ML: 7.5; 325 SOLUTION ORAL at 22:02

## 2021-05-03 LAB
ANION GAP SERPL CALCULATED.3IONS-SCNC: 11 MMOL/L (ref 5–15)
BACTERIA FLD CULT: NORMAL
BUN SERPL-MCNC: 36 MG/DL (ref 8–23)
BUN/CREAT SERPL: 53.7 (ref 7–25)
CALCIUM SPEC-SCNC: 9.1 MG/DL (ref 8.6–10.5)
CHLORIDE SERPL-SCNC: 101 MMOL/L (ref 98–107)
CO2 SERPL-SCNC: 24 MMOL/L (ref 22–29)
CREAT SERPL-MCNC: 0.67 MG/DL (ref 0.57–1)
DEPRECATED RDW RBC AUTO: 48.1 FL (ref 37–54)
ERYTHROCYTE [DISTWIDTH] IN BLOOD BY AUTOMATED COUNT: 14.5 % (ref 12.3–15.4)
GFR SERPL CREATININE-BSD FRML MDRD: 100 ML/MIN/1.73
GFR SERPL CREATININE-BSD FRML MDRD: 83 ML/MIN/1.73
GLUCOSE SERPL-MCNC: 120 MG/DL (ref 65–99)
GRAM STN SPEC: NORMAL
GRAM STN SPEC: NORMAL
HCT VFR BLD AUTO: 31.5 % (ref 34–46.6)
HGB BLD-MCNC: 9.7 G/DL (ref 12–15.9)
MCH RBC QN AUTO: 28 PG (ref 26.6–33)
MCHC RBC AUTO-ENTMCNC: 30.8 G/DL (ref 31.5–35.7)
MCV RBC AUTO: 91 FL (ref 79–97)
PLATELET # BLD AUTO: 536 10*3/MM3 (ref 140–450)
PMV BLD AUTO: 10.7 FL (ref 6–12)
POTASSIUM SERPL-SCNC: 4.5 MMOL/L (ref 3.5–5.2)
RBC # BLD AUTO: 3.46 10*6/MM3 (ref 3.77–5.28)
SODIUM SERPL-SCNC: 136 MMOL/L (ref 136–145)
WBC # BLD AUTO: 25.7 10*3/MM3 (ref 3.4–10.8)

## 2021-05-03 PROCEDURE — 85027 COMPLETE CBC AUTOMATED: CPT | Performed by: INTERNAL MEDICINE

## 2021-05-03 PROCEDURE — 94799 UNLISTED PULMONARY SVC/PX: CPT

## 2021-05-03 PROCEDURE — 99233 SBSQ HOSP IP/OBS HIGH 50: CPT | Performed by: INTERNAL MEDICINE

## 2021-05-03 PROCEDURE — 25010000002 HEPARIN (PORCINE) PER 1000 UNITS: Performed by: INTERNAL MEDICINE

## 2021-05-03 PROCEDURE — 80048 BASIC METABOLIC PNL TOTAL CA: CPT | Performed by: INTERNAL MEDICINE

## 2021-05-03 PROCEDURE — 25010000002 MEROPENEM PER 100 MG: Performed by: INTERNAL MEDICINE

## 2021-05-03 RX ORDER — LEVOFLOXACIN 750 MG/1
750 TABLET ORAL
Status: DISCONTINUED | OUTPATIENT
Start: 2021-05-03 | End: 2021-05-05 | Stop reason: HOSPADM

## 2021-05-03 RX ORDER — LEVOFLOXACIN 750 MG/1
750 TABLET ORAL NIGHTLY
Status: DISCONTINUED | OUTPATIENT
Start: 2021-05-03 | End: 2021-05-03

## 2021-05-03 RX ORDER — BISACODYL 10 MG
10 SUPPOSITORY, RECTAL RECTAL ONCE
Status: COMPLETED | OUTPATIENT
Start: 2021-05-03 | End: 2021-05-03

## 2021-05-03 RX ORDER — HYDROCODONE BITARTRATE AND ACETAMINOPHEN 7.5; 325 MG/1; MG/1
1 TABLET ORAL EVERY 6 HOURS
Status: DISCONTINUED | OUTPATIENT
Start: 2021-05-03 | End: 2021-05-05 | Stop reason: HOSPADM

## 2021-05-03 RX ORDER — METRONIDAZOLE 500 MG/1
500 TABLET ORAL EVERY 8 HOURS SCHEDULED
Status: DISCONTINUED | OUTPATIENT
Start: 2021-05-03 | End: 2021-05-05 | Stop reason: HOSPADM

## 2021-05-03 RX ORDER — GUAIFENESIN 600 MG/1
600 TABLET, EXTENDED RELEASE ORAL EVERY 12 HOURS SCHEDULED
Status: DISCONTINUED | OUTPATIENT
Start: 2021-05-03 | End: 2021-05-05 | Stop reason: HOSPADM

## 2021-05-03 RX ORDER — MAGNESIUM CARB/ALUMINUM HYDROX 105-160MG
150 TABLET,CHEWABLE ORAL ONCE
Status: COMPLETED | OUTPATIENT
Start: 2021-05-03 | End: 2021-05-04

## 2021-05-03 RX ORDER — BISACODYL 10 MG
10 SUPPOSITORY, RECTAL RECTAL DAILY PRN
Status: DISCONTINUED | OUTPATIENT
Start: 2021-05-03 | End: 2021-05-05 | Stop reason: HOSPADM

## 2021-05-03 RX ORDER — ONDANSETRON 4 MG/1
4 TABLET, FILM COATED ORAL EVERY 6 HOURS PRN
Status: DISCONTINUED | OUTPATIENT
Start: 2021-05-03 | End: 2021-05-05 | Stop reason: HOSPADM

## 2021-05-03 RX ORDER — IPRATROPIUM BROMIDE AND ALBUTEROL SULFATE 2.5; .5 MG/3ML; MG/3ML
3 SOLUTION RESPIRATORY (INHALATION)
Status: DISCONTINUED | OUTPATIENT
Start: 2021-05-03 | End: 2021-05-05 | Stop reason: HOSPADM

## 2021-05-03 RX ADMIN — IPRATROPIUM BROMIDE AND ALBUTEROL SULFATE 3 ML: 2.5; .5 SOLUTION RESPIRATORY (INHALATION) at 12:19

## 2021-05-03 RX ADMIN — GUAIFENESIN 600 MG: 600 TABLET, EXTENDED RELEASE ORAL at 20:43

## 2021-05-03 RX ADMIN — IPRATROPIUM BROMIDE AND ALBUTEROL SULFATE 3 ML: 2.5; .5 SOLUTION RESPIRATORY (INHALATION) at 18:56

## 2021-05-03 RX ADMIN — HYDROCODONE BITARTRATE AND ACETAMINOPHEN 1 TABLET: 7.5; 325 TABLET ORAL at 18:35

## 2021-05-03 RX ADMIN — MEROPENEM 1 G: 1 INJECTION, POWDER, FOR SOLUTION INTRAVENOUS at 14:36

## 2021-05-03 RX ADMIN — DOCUSATE SODIUM 100 MG: 100 CAPSULE ORAL at 08:18

## 2021-05-03 RX ADMIN — ACETAMINOPHEN 650 MG: 325 TABLET ORAL at 20:43

## 2021-05-03 RX ADMIN — HYDROCODONE BITARTRATE AND ACETAMINOPHEN 10 ML: 7.5; 325 SOLUTION ORAL at 06:33

## 2021-05-03 RX ADMIN — IPRATROPIUM BROMIDE AND ALBUTEROL SULFATE 3 ML: 2.5; .5 SOLUTION RESPIRATORY (INHALATION) at 22:48

## 2021-05-03 RX ADMIN — POLYETHYLENE GLYCOL 3350 17 G: 17 POWDER, FOR SOLUTION ORAL at 08:19

## 2021-05-03 RX ADMIN — GUAIFENESIN 600 MG: 600 TABLET, EXTENDED RELEASE ORAL at 12:57

## 2021-05-03 RX ADMIN — IPRATROPIUM BROMIDE AND ALBUTEROL SULFATE 3 ML: 2.5; .5 SOLUTION RESPIRATORY (INHALATION) at 17:23

## 2021-05-03 RX ADMIN — DOCUSATE SODIUM 100 MG: 100 CAPSULE ORAL at 20:43

## 2021-05-03 RX ADMIN — HEPARIN SODIUM 5000 UNITS: 5000 INJECTION INTRAVENOUS; SUBCUTANEOUS at 06:33

## 2021-05-03 RX ADMIN — LEVOFLOXACIN 750 MG: 750 TABLET, FILM COATED ORAL at 23:36

## 2021-05-03 RX ADMIN — HEPARIN SODIUM 5000 UNITS: 5000 INJECTION INTRAVENOUS; SUBCUTANEOUS at 20:43

## 2021-05-03 RX ADMIN — METRONIDAZOLE 500 MG: 500 TABLET ORAL at 23:36

## 2021-05-03 RX ADMIN — BISACODYL 10 MG: 10 SUPPOSITORY RECTAL at 18:36

## 2021-05-03 RX ADMIN — HYDROCODONE BITARTRATE AND ACETAMINOPHEN 10 ML: 7.5; 325 SOLUTION ORAL at 12:57

## 2021-05-03 RX ADMIN — HYDROCODONE BITARTRATE AND ACETAMINOPHEN 1 TABLET: 7.5; 325 TABLET ORAL at 23:36

## 2021-05-03 RX ADMIN — HEPARIN SODIUM 5000 UNITS: 5000 INJECTION INTRAVENOUS; SUBCUTANEOUS at 12:57

## 2021-05-04 LAB
ANION GAP SERPL CALCULATED.3IONS-SCNC: 7 MMOL/L (ref 5–15)
BASOPHILS # BLD AUTO: 0.04 10*3/MM3 (ref 0–0.2)
BASOPHILS NFR BLD AUTO: 0.3 % (ref 0–1.5)
BUN SERPL-MCNC: 28 MG/DL (ref 8–23)
BUN/CREAT SERPL: 48.3 (ref 7–25)
CALCIUM SPEC-SCNC: 8.6 MG/DL (ref 8.6–10.5)
CHLORIDE SERPL-SCNC: 102 MMOL/L (ref 98–107)
CO2 SERPL-SCNC: 27 MMOL/L (ref 22–29)
CREAT SERPL-MCNC: 0.58 MG/DL (ref 0.57–1)
DEPRECATED RDW RBC AUTO: 49.4 FL (ref 37–54)
EOSINOPHIL # BLD AUTO: 0.2 10*3/MM3 (ref 0–0.4)
EOSINOPHIL NFR BLD AUTO: 1.3 % (ref 0.3–6.2)
ERYTHROCYTE [DISTWIDTH] IN BLOOD BY AUTOMATED COUNT: 14.6 % (ref 12.3–15.4)
GFR SERPL CREATININE-BSD FRML MDRD: 119 ML/MIN/1.73
GFR SERPL CREATININE-BSD FRML MDRD: 98 ML/MIN/1.73
GLUCOSE SERPL-MCNC: 102 MG/DL (ref 65–99)
HCT VFR BLD AUTO: 29 % (ref 34–46.6)
HGB BLD-MCNC: 8.8 G/DL (ref 12–15.9)
IMM GRANULOCYTES # BLD AUTO: 0.29 10*3/MM3 (ref 0–0.05)
IMM GRANULOCYTES NFR BLD AUTO: 1.9 % (ref 0–0.5)
LYMPHOCYTES # BLD AUTO: 0.83 10*3/MM3 (ref 0.7–3.1)
LYMPHOCYTES NFR BLD AUTO: 5.5 % (ref 19.6–45.3)
MCH RBC QN AUTO: 28.3 PG (ref 26.6–33)
MCHC RBC AUTO-ENTMCNC: 30.3 G/DL (ref 31.5–35.7)
MCV RBC AUTO: 93.2 FL (ref 79–97)
MONOCYTES # BLD AUTO: 1.62 10*3/MM3 (ref 0.1–0.9)
MONOCYTES NFR BLD AUTO: 10.8 % (ref 5–12)
NEUTROPHILS NFR BLD AUTO: 12.08 10*3/MM3 (ref 1.7–7)
NEUTROPHILS NFR BLD AUTO: 80.2 % (ref 42.7–76)
NRBC BLD AUTO-RTO: 0 /100 WBC (ref 0–0.2)
PLATELET # BLD AUTO: 410 10*3/MM3 (ref 140–450)
PMV BLD AUTO: 10.2 FL (ref 6–12)
POTASSIUM SERPL-SCNC: 4.5 MMOL/L (ref 3.5–5.2)
RBC # BLD AUTO: 3.11 10*6/MM3 (ref 3.77–5.28)
SODIUM SERPL-SCNC: 136 MMOL/L (ref 136–145)
WBC # BLD AUTO: 15.06 10*3/MM3 (ref 3.4–10.8)

## 2021-05-04 PROCEDURE — 94799 UNLISTED PULMONARY SVC/PX: CPT

## 2021-05-04 PROCEDURE — 99239 HOSP IP/OBS DSCHRG MGMT >30: CPT | Performed by: INTERNAL MEDICINE

## 2021-05-04 PROCEDURE — 80048 BASIC METABOLIC PNL TOTAL CA: CPT | Performed by: INTERNAL MEDICINE

## 2021-05-04 PROCEDURE — 97110 THERAPEUTIC EXERCISES: CPT

## 2021-05-04 PROCEDURE — 85025 COMPLETE CBC W/AUTO DIFF WBC: CPT | Performed by: INTERNAL MEDICINE

## 2021-05-04 PROCEDURE — 25010000002 HEPARIN (PORCINE) PER 1000 UNITS: Performed by: INTERNAL MEDICINE

## 2021-05-04 PROCEDURE — 63710000001 ONDANSETRON PER 8 MG: Performed by: INTERNAL MEDICINE

## 2021-05-04 RX ORDER — BISACODYL 10 MG
10 SUPPOSITORY, RECTAL RECTAL DAILY
Status: DISCONTINUED | OUTPATIENT
Start: 2021-05-05 | End: 2021-05-05 | Stop reason: HOSPADM

## 2021-05-04 RX ORDER — BISACODYL 10 MG
10 SUPPOSITORY, RECTAL RECTAL ONCE
Status: COMPLETED | OUTPATIENT
Start: 2021-05-04 | End: 2021-05-04

## 2021-05-04 RX ORDER — GUAIFENESIN 600 MG/1
600 TABLET, EXTENDED RELEASE ORAL EVERY 12 HOURS SCHEDULED
Qty: 28 TABLET | Refills: 0 | Status: SHIPPED | OUTPATIENT
Start: 2021-05-04 | End: 2021-05-18

## 2021-05-04 RX ORDER — LEVOFLOXACIN 750 MG/1
750 TABLET ORAL
Qty: 3 TABLET | Refills: 0 | Status: SHIPPED | OUTPATIENT
Start: 2021-05-05 | End: 2021-05-11

## 2021-05-04 RX ORDER — LACTULOSE 10 G/15ML
10 SOLUTION ORAL DAILY
Status: COMPLETED | OUTPATIENT
Start: 2021-05-04 | End: 2021-05-04

## 2021-05-04 RX ORDER — HYDROCODONE BITARTRATE AND ACETAMINOPHEN 7.5; 325 MG/1; MG/1
1 TABLET ORAL EVERY 6 HOURS
Qty: 20 TABLET | Refills: 0 | Status: SHIPPED | OUTPATIENT
Start: 2021-05-04 | End: 2021-05-10

## 2021-05-04 RX ORDER — METRONIDAZOLE 500 MG/1
500 TABLET ORAL EVERY 8 HOURS SCHEDULED
Qty: 19 TABLET | Refills: 0 | Status: SHIPPED | OUTPATIENT
Start: 2021-05-04 | End: 2021-05-12

## 2021-05-04 RX ORDER — DIAZEPAM 2 MG/1
2 TABLET ORAL EVERY 6 HOURS PRN
Status: DISCONTINUED | OUTPATIENT
Start: 2021-05-04 | End: 2021-05-05 | Stop reason: HOSPADM

## 2021-05-04 RX ADMIN — IPRATROPIUM BROMIDE AND ALBUTEROL SULFATE 3 ML: 2.5; .5 SOLUTION RESPIRATORY (INHALATION) at 02:41

## 2021-05-04 RX ADMIN — HYDROCODONE BITARTRATE AND ACETAMINOPHEN 1 TABLET: 7.5; 325 TABLET ORAL at 23:48

## 2021-05-04 RX ADMIN — HEPARIN SODIUM 5000 UNITS: 5000 INJECTION INTRAVENOUS; SUBCUTANEOUS at 14:31

## 2021-05-04 RX ADMIN — POLYETHYLENE GLYCOL 3350 17 G: 17 POWDER, FOR SOLUTION ORAL at 08:54

## 2021-05-04 RX ADMIN — DOCUSATE SODIUM 100 MG: 100 CAPSULE ORAL at 08:54

## 2021-05-04 RX ADMIN — DOCUSATE SODIUM 100 MG: 100 CAPSULE ORAL at 21:06

## 2021-05-04 RX ADMIN — METRONIDAZOLE 500 MG: 500 TABLET ORAL at 14:31

## 2021-05-04 RX ADMIN — HYDROCODONE BITARTRATE AND ACETAMINOPHEN 1 TABLET: 7.5; 325 TABLET ORAL at 06:18

## 2021-05-04 RX ADMIN — LACTULOSE 10 G: 20 SOLUTION ORAL at 21:08

## 2021-05-04 RX ADMIN — LACTULOSE 10 G: 20 SOLUTION ORAL at 16:36

## 2021-05-04 RX ADMIN — HEPARIN SODIUM 5000 UNITS: 5000 INJECTION INTRAVENOUS; SUBCUTANEOUS at 06:19

## 2021-05-04 RX ADMIN — HYDROCODONE BITARTRATE AND ACETAMINOPHEN 1 TABLET: 7.5; 325 TABLET ORAL at 18:25

## 2021-05-04 RX ADMIN — Medication 150 ML: at 23:48

## 2021-05-04 RX ADMIN — HYDROCODONE BITARTRATE AND ACETAMINOPHEN 1 TABLET: 7.5; 325 TABLET ORAL at 12:29

## 2021-05-04 RX ADMIN — IPRATROPIUM BROMIDE AND ALBUTEROL SULFATE 3 ML: 2.5; .5 SOLUTION RESPIRATORY (INHALATION) at 18:38

## 2021-05-04 RX ADMIN — DIAZEPAM 2 MG: 2 TABLET ORAL at 14:31

## 2021-05-04 RX ADMIN — ACETAMINOPHEN 650 MG: 325 TABLET ORAL at 16:35

## 2021-05-04 RX ADMIN — DIAZEPAM 2 MG: 2 TABLET ORAL at 21:06

## 2021-05-04 RX ADMIN — ONDANSETRON HYDROCHLORIDE 4 MG: 4 TABLET, FILM COATED ORAL at 10:50

## 2021-05-04 RX ADMIN — BISACODYL 10 MG: 10 SUPPOSITORY RECTAL at 14:31

## 2021-05-04 RX ADMIN — METRONIDAZOLE 500 MG: 500 TABLET ORAL at 06:18

## 2021-05-04 RX ADMIN — IPRATROPIUM BROMIDE AND ALBUTEROL SULFATE 3 ML: 2.5; .5 SOLUTION RESPIRATORY (INHALATION) at 16:02

## 2021-05-04 RX ADMIN — IPRATROPIUM BROMIDE AND ALBUTEROL SULFATE 3 ML: 2.5; .5 SOLUTION RESPIRATORY (INHALATION) at 12:09

## 2021-05-04 RX ADMIN — HEPARIN SODIUM 5000 UNITS: 5000 INJECTION INTRAVENOUS; SUBCUTANEOUS at 21:06

## 2021-05-04 RX ADMIN — IPRATROPIUM BROMIDE AND ALBUTEROL SULFATE 3 ML: 2.5; .5 SOLUTION RESPIRATORY (INHALATION) at 06:38

## 2021-05-04 RX ADMIN — METRONIDAZOLE 500 MG: 500 TABLET ORAL at 21:06

## 2021-05-04 RX ADMIN — GUAIFENESIN 600 MG: 600 TABLET, EXTENDED RELEASE ORAL at 08:54

## 2021-05-04 RX ADMIN — GUAIFENESIN 600 MG: 600 TABLET, EXTENDED RELEASE ORAL at 21:06

## 2021-05-04 RX ADMIN — IPRATROPIUM BROMIDE AND ALBUTEROL SULFATE 3 ML: 2.5; .5 SOLUTION RESPIRATORY (INHALATION) at 21:24

## 2021-05-04 RX ADMIN — ACETAMINOPHEN 650 MG: 325 TABLET ORAL at 08:54

## 2021-05-05 VITALS
RESPIRATION RATE: 20 BRPM | HEART RATE: 84 BPM | OXYGEN SATURATION: 93 % | DIASTOLIC BLOOD PRESSURE: 60 MMHG | SYSTOLIC BLOOD PRESSURE: 115 MMHG | TEMPERATURE: 97.3 F | BODY MASS INDEX: 16.32 KG/M2 | HEIGHT: 67 IN | WEIGHT: 104 LBS

## 2021-05-05 LAB
BACTERIA SPEC AEROBE CULT: NORMAL
BACTERIA SPEC ANAEROBE CULT: NORMAL

## 2021-05-05 PROCEDURE — 97110 THERAPEUTIC EXERCISES: CPT

## 2021-05-05 PROCEDURE — 25010000002 HEPARIN (PORCINE) PER 1000 UNITS: Performed by: INTERNAL MEDICINE

## 2021-05-05 PROCEDURE — 94799 UNLISTED PULMONARY SVC/PX: CPT

## 2021-05-05 PROCEDURE — 99232 SBSQ HOSP IP/OBS MODERATE 35: CPT | Performed by: NURSE PRACTITIONER

## 2021-05-05 RX ORDER — DIAZEPAM 2 MG/1
2 TABLET ORAL EVERY 6 HOURS PRN
Qty: 10 TABLET | Refills: 0 | Status: SHIPPED | OUTPATIENT
Start: 2021-05-05 | End: 2021-05-11

## 2021-05-05 RX ORDER — IPRATROPIUM BROMIDE AND ALBUTEROL SULFATE 2.5; .5 MG/3ML; MG/3ML
3 SOLUTION RESPIRATORY (INHALATION) EVERY 4 HOURS PRN
Qty: 360 ML | Refills: 3 | Status: SHIPPED | OUTPATIENT
Start: 2021-05-05 | End: 2021-06-04

## 2021-05-05 RX ADMIN — BISACODYL 10 MG: 10 SUPPOSITORY RECTAL at 09:37

## 2021-05-05 RX ADMIN — METRONIDAZOLE 500 MG: 500 TABLET ORAL at 12:50

## 2021-05-05 RX ADMIN — HYDROCODONE BITARTRATE AND ACETAMINOPHEN 1 TABLET: 7.5; 325 TABLET ORAL at 06:01

## 2021-05-05 RX ADMIN — DIAZEPAM 2 MG: 2 TABLET ORAL at 14:43

## 2021-05-05 RX ADMIN — HEPARIN SODIUM 5000 UNITS: 5000 INJECTION INTRAVENOUS; SUBCUTANEOUS at 06:01

## 2021-05-05 RX ADMIN — DIAZEPAM 2 MG: 2 TABLET ORAL at 06:01

## 2021-05-05 RX ADMIN — IPRATROPIUM BROMIDE AND ALBUTEROL SULFATE 3 ML: 2.5; .5 SOLUTION RESPIRATORY (INHALATION) at 05:34

## 2021-05-05 RX ADMIN — METRONIDAZOLE 500 MG: 500 TABLET ORAL at 06:01

## 2021-05-05 RX ADMIN — GUAIFENESIN 600 MG: 600 TABLET, EXTENDED RELEASE ORAL at 09:15

## 2021-05-05 RX ADMIN — HYDROCODONE BITARTRATE AND ACETAMINOPHEN 1 TABLET: 7.5; 325 TABLET ORAL at 12:50

## 2021-06-11 LAB
MYCOBACTERIUM SPEC CULT: NORMAL
NIGHT BLUE STAIN TISS: NORMAL

## (undated) DEVICE — ANTIBACTERIAL UNDYED BRAIDED (POLYGLACTIN 910), SYNTHETIC ABSORBABLE SUTURE: Brand: COATED VICRYL

## (undated) DEVICE — 3M™ STERI-DRAPE™ U-DRAPE 1015: Brand: STERI-DRAPE™

## (undated) DEVICE — GLV SURG SENSICARE ORTHO PF LF 8 STRL

## (undated) DEVICE — INTENT TO BE USED WITH SUTURE MATERIAL FOR TISSUE CLOSURE: Brand: RICHARD-ALLAN® NEEDLE 1/2 CIRCLE TAPER

## (undated) DEVICE — SUT VIC 2/0 CT2 27IN J269H

## (undated) DEVICE — Device: Brand: AFFIXUS® NATURAL NAIL®

## (undated) DEVICE — C-ARM: Brand: UNBRANDED

## (undated) DEVICE — GLV SURG PREMIERPRO GAMMEX NEOPRN PF SZ8 GRN

## (undated) DEVICE — ELECTRD BLD EZ CLN STD 6.5IN

## (undated) DEVICE — ELECTRD BLD EZ CLN STD 4IN

## (undated) DEVICE — GOWN,REINFORCE,POLY,SIRUS,BREATH SLV,XLG: Brand: MEDLINE

## (undated) DEVICE — PK MAJ SHLDR SPLT 10

## (undated) DEVICE — SYS CLS SKIN PREMIERPRO EXOFINFUSION 22CM

## (undated) DEVICE — PENCL ROCKRSWCH MEGADYNE W/HOLSTR 10FT SS

## (undated) DEVICE — ELECTRD BLD EZ CLN STD 2.5IN

## (undated) DEVICE — SYS SKIN CLS DERMABOND PRINEO W/22CM MESH TP

## (undated) DEVICE — IMPLANTABLE DEVICE
Type: IMPLANTABLE DEVICE | Site: HUMERUS | Status: NON-FUNCTIONAL
Brand: KIRSCHNER WIRE
Removed: 2021-04-10

## (undated) DEVICE — POSTN HD UNIV

## (undated) DEVICE — SUT MONOCRYL PLS ANTIB UND 3/0  PS1 27IN

## (undated) DEVICE — PATIENT RETURN ELECTRODE, SINGLE-USE, CONTACT QUALITY MONITORING, ADULT, WITH 9FT CORD, FOR PATIENTS WEIGING OVER 33LBS. (15KG): Brand: MEGADYNE

## (undated) DEVICE — COATED BRAIDED POLYESTER: Brand: TI-CRON